# Patient Record
Sex: FEMALE | Race: WHITE | NOT HISPANIC OR LATINO | Employment: UNEMPLOYED | URBAN - METROPOLITAN AREA
[De-identification: names, ages, dates, MRNs, and addresses within clinical notes are randomized per-mention and may not be internally consistent; named-entity substitution may affect disease eponyms.]

---

## 2020-01-13 LAB — CRC RECOMMENDATION EXT: NORMAL

## 2022-12-07 LAB — BCS RECOMMENDATION EXT: NORMAL

## 2023-04-13 LAB
A1C: 4.9
HDLC SERPL-MCNC: 90 MG/DL
LDLC SERPL CALC-MCNC: 62 MG/DL
LIPIDS, TOTAL CHOLESTEROL: 162
TRIGL SERPL-MCNC: 76 MG/DL

## 2023-05-09 LAB — HCV AB SER-ACNC: NEGATIVE

## 2023-05-15 LAB
HUMAN PAPILLOMAVIRUS (HPV): NORMAL
PAP RECOMMENDATION EXT: NORMAL
PAP SMEAR: NORMAL

## 2023-08-22 ENCOUNTER — TELEPHONE (OUTPATIENT)
Dept: FAMILY MEDICINE | Facility: CLINIC | Age: 53
End: 2023-08-22

## 2023-08-22 ENCOUNTER — HOSPITAL ENCOUNTER (OUTPATIENT)
Dept: RADIOLOGY | Facility: HOSPITAL | Age: 53
Discharge: HOME | End: 2023-08-22
Payer: OTHER GOVERNMENT

## 2023-08-22 ENCOUNTER — OFFICE VISIT (OUTPATIENT)
Dept: FAMILY MEDICINE | Facility: CLINIC | Age: 53
End: 2023-08-22
Payer: OTHER GOVERNMENT

## 2023-08-22 VITALS
BODY MASS INDEX: 27.32 KG/M2 | WEIGHT: 170 LBS | HEART RATE: 76 BPM | SYSTOLIC BLOOD PRESSURE: 115 MMHG | OXYGEN SATURATION: 98 % | RESPIRATION RATE: 16 BRPM | HEIGHT: 66 IN | DIASTOLIC BLOOD PRESSURE: 75 MMHG

## 2023-08-22 DIAGNOSIS — M25.522 LEFT ELBOW PAIN: ICD-10-CM

## 2023-08-22 DIAGNOSIS — M25.50 MULTIPLE JOINT PAIN: ICD-10-CM

## 2023-08-22 DIAGNOSIS — F10.10 ALCOHOL ABUSE: ICD-10-CM

## 2023-08-22 DIAGNOSIS — F33.1 MODERATE EPISODE OF RECURRENT MAJOR DEPRESSIVE DISORDER: ICD-10-CM

## 2023-08-22 DIAGNOSIS — L98.9 SKIN LESION: ICD-10-CM

## 2023-08-22 DIAGNOSIS — M54.2 NECK PAIN: ICD-10-CM

## 2023-08-22 DIAGNOSIS — Z76.89 ENCOUNTER TO ESTABLISH CARE: Primary | ICD-10-CM

## 2023-08-22 DIAGNOSIS — I49.3 VENTRICULAR PREMATURE BEATS: ICD-10-CM

## 2023-08-22 PROBLEM — S82.63XA FRACTURE OF LATERAL MALLEOLUS: Status: ACTIVE | Noted: 2023-08-22

## 2023-08-22 PROBLEM — K58.9 IRRITABLE BOWEL SYNDROME: Status: ACTIVE | Noted: 2023-08-22

## 2023-08-22 PROBLEM — J30.9 ALLERGIC RHINITIS: Status: ACTIVE | Noted: 2023-08-22

## 2023-08-22 PROBLEM — H11.009 PTERYGIUM OF EYE: Status: ACTIVE | Noted: 2023-08-22

## 2023-08-22 PROBLEM — F32.9 MAJOR DEPRESSIVE DISORDER: Status: ACTIVE | Noted: 2023-05-15

## 2023-08-22 PROBLEM — F41.9 ANXIETY: Status: ACTIVE | Noted: 2023-08-22

## 2023-08-22 PROCEDURE — 99204 PR OFFICE/OUTPT VISIT, NEW, LEVL IV, 45-59 MIN: ICD-10-PCS | Mod: 25,S$GLB,,

## 2023-08-22 PROCEDURE — 99204 OFFICE O/P NEW MOD 45 MIN: CPT | Mod: 25,S$GLB,,

## 2023-08-22 PROCEDURE — 93000 POCT EKG 12-LEAD: ICD-10-PCS | Mod: S$GLB,,,

## 2023-08-22 PROCEDURE — 73080 X-RAY EXAM OF ELBOW: CPT | Mod: TC,PO,LT

## 2023-08-22 PROCEDURE — 93000 ELECTROCARDIOGRAM COMPLETE: CPT | Mod: S$GLB,,,

## 2023-08-22 PROCEDURE — 72050 X-RAY EXAM NECK SPINE 4/5VWS: CPT | Mod: TC,PO

## 2023-08-22 RX ORDER — DICLOFENAC SODIUM 10 MG/G
GEL TOPICAL
COMMUNITY
Start: 2023-05-15 | End: 2023-10-23

## 2023-08-22 RX ORDER — TRETINOIN 0.5 MG/G
CREAM TOPICAL
COMMUNITY
Start: 2023-05-15

## 2023-08-22 RX ORDER — METOPROLOL SUCCINATE 25 MG/1
TABLET, EXTENDED RELEASE ORAL
COMMUNITY
Start: 2023-05-15 | End: 2023-10-06 | Stop reason: SDUPTHER

## 2023-08-22 RX ORDER — CALCIPOTRIENE 50 UG/G
OINTMENT TOPICAL
COMMUNITY
Start: 2023-08-21

## 2023-08-22 RX ORDER — SERTRALINE HYDROCHLORIDE 200 MG/1
CAPSULE ORAL
COMMUNITY
Start: 2023-08-21 | End: 2024-02-20

## 2023-08-22 RX ORDER — MULTIVITAMIN
TABLET ORAL
COMMUNITY
Start: 2022-12-14

## 2023-08-22 RX ORDER — HYDROXYZINE HYDROCHLORIDE 25 MG/1
TABLET, FILM COATED ORAL
COMMUNITY
Start: 2022-08-15 | End: 2023-09-18 | Stop reason: SDUPTHER

## 2023-08-22 RX ORDER — FOLIC ACID 1 MG/1
1 TABLET ORAL
COMMUNITY
Start: 2022-08-21 | End: 2023-10-06 | Stop reason: SDUPTHER

## 2023-08-22 RX ORDER — MELOXICAM 7.5 MG/1
7.5 TABLET ORAL 2 TIMES DAILY PRN
Qty: 30 TABLET | Refills: 1 | Status: SHIPPED | OUTPATIENT
Start: 2023-08-22 | End: 2023-09-13 | Stop reason: SDUPTHER

## 2023-08-22 RX ORDER — OMEPRAZOLE 20 MG/1
20 CAPSULE, DELAYED RELEASE ORAL
COMMUNITY
Start: 2023-05-15 | End: 2023-10-06 | Stop reason: SDUPTHER

## 2023-08-22 RX ORDER — ATORVASTATIN CALCIUM 10 MG/1
10 TABLET, FILM COATED ORAL
COMMUNITY
Start: 2023-05-15 | End: 2023-09-06

## 2023-08-22 NOTE — TELEPHONE ENCOUNTER
----- Message from JETHRO Dominguez-TYRON sent at 8/22/2023  4:37 PM CDT -----  Please let Mrs. Newsome know that her elbow xray was normal and her neck xray just shows arthritis. We can refer to ortho for elbow AND neck, or we can do ortho for elbow and pain management for neck. Up to her. Ortho- anyone at [address] [place] [address] Drive. [facility] for pm if they take [facility]. Thanks.

## 2023-08-22 NOTE — PROGRESS NOTES
SUBJECTIVE:    Patient ID: Joanne Hummel is a 52 y.o. female.    Chief Complaint: Establish Care and Medication Refill    Patient presents to clinic today to establish care.  She has several concerns to discuss today.  First she states that she has broken her  left ankle twice in the last 2 years, and she continues to have intermittent swelling. Also has some mild ache in the joint.  She complains of joint stiffness and pain upon waking that lasts throughout the day.  This is in multiple joints including her ankles knees shoulders hips hands.  She stopped taking her Lipitor due to this discomfort.  She does note that being off the Lipitor has not made any positive change.  She has also been having pain in her left elbow, she states for several weeks, which feels like a dull ache towards the outside of her joint.  She states that it does not radiate.  It is aggravated by movement.  She also complains of neck stiffness and pain and states that she has had x-rays in the past that did show some arthritis in her neck.  She feels like she goes from hot to cold within minutes. Went through menopause with no period since age 46 approx.  She has had skin mapping by derm, and has had lesions removed but all were benign.  She would like a referral to a dermatologist locally.  She and her family moved here recently.  Her  is in the [employer].  There was stationed here.  Discussed the state of her mental health.  She states that she is unhappy in her relationship at this time.  She states that she has spoken with her partner regarding her feelings but there is a lack of communication.  She would like a referral to psych for counseling.  She denies being a risk for suicide.  She states she is happy with life she is just not happy in her relationship.  She does suffer from anxiety.  She treats her stressors with consumption of alcohol.  She states that she drinks approximately 3 drinks of hard liquor daily.  She does  not need to drink when she wakes.  She is a history of IBS with diarrhea.  She has been noticing small red spots on her skin that appear to be similar to a bug bite but do not have ahead and do not itch.  She quit smoking 7 years ago.  She smoked from the time she was 13 years old.  She did have some labs with her most recent PCP prior to moving here.  She does have a copy of those today.  They will be loaded into media.  She also had a DEXA scan in May.  It did show osteopenia.  Patient has a history of palpitations.  We will do an EKG today for baseline.  She would like to discuss the possibility of having a breast reduction as she feels like a weight of her breasts cause of her shoulder and neck pain.  Given Mrs. Newsome's  complaints today like to have her follow-up for a well visit in the near future so that we can discuss health maintenance.  At this time I would like to investigate her current complaints.  She does state that she would like to come back in a week to discuss her lab and test results.    Medication Refill  Associated symptoms include arthralgias, fatigue, joint swelling, myalgias and neck pain. Pertinent negatives include no abdominal pain, chest pain, chills, congestion, coughing, fever, headaches, nausea, numbness, rash, sore throat, vomiting or weakness.       Office Visit on 08/22/2023   Component Date Value Ref Range Status    EKG 12-Lead 08/27/2023    Final    Ventricular Rate 08/27/2023 62   Final    NE Interval 08/27/2023 152   Final    QRS Duration 08/27/2023 78   Final    QT/QTc 08/27/2023 418/424   Final    PRT Axes 08/27/2023 37  81  67   Final    WBC 08/22/2023 6.6  3.8 - 10.8 Thousand/uL Final    RBC 08/22/2023 4.30  3.80 - 5.10 Million/uL Final    Hemoglobin 08/22/2023 12.1  11.7 - 15.5 g/dL Final    Hematocrit 08/22/2023 37.1  35.0 - 45.0 % Final    MCV 08/22/2023 86.3  80.0 - 100.0 fL Final    MCH 08/22/2023 28.1  27.0 - 33.0 pg Final    MCHC 08/22/2023 32.6  32.0 - 36.0 g/dL  Final    RDW 08/22/2023 13.3  11.0 - 15.0 % Final    Platelets 08/22/2023 234  140 - 400 Thousand/uL Final    MPV 08/22/2023 9.7  7.5 - 12.5 fL Final    Neutrophils, Abs 08/22/2023 4,105  1,500 - 7,800 cells/uL Final    Lymph # 08/22/2023 1,888  850 - 3,900 cells/uL Final    Mono # 08/22/2023 442  200 - 950 cells/uL Final    Eos # 08/22/2023 132  15 - 500 cells/uL Final    Baso # 08/22/2023 33  0 - 200 cells/uL Final    Neutrophils Relative 08/22/2023 62.2  % Final    Lymph % 08/22/2023 28.6  % Final    Mono % 08/22/2023 6.7  % Final    Eosinophil % 08/22/2023 2.0  % Final    Basophil % 08/22/2023 0.5  % Final    SHARAN 08/22/2023 POSITIVE (A)  NEGATIVE Final    SHARAN Titer 08/22/2023 1:320 (H)  titer Final    SHARAN Pattern 08/22/2023 Nuclear, Dense Fine Speckled (A)   Final    Glucose 08/22/2023 87  65 - 99 mg/dL Final    BUN 08/22/2023 18  7 - 25 mg/dL Final    Creatinine 08/22/2023 0.70  0.50 - 1.03 mg/dL Final    eGFR 08/22/2023 103  > OR = 60 mL/min/1.73m2 Final    BUN/Creatinine Ratio 08/22/2023 SEE NOTE:  6 - 22 (calc) Final    Sodium 08/22/2023 139  135 - 146 mmol/L Final    Potassium 08/22/2023 4.6  3.5 - 5.3 mmol/L Final    Chloride 08/22/2023 102  98 - 110 mmol/L Final    CO2 08/22/2023 29  20 - 32 mmol/L Final    Calcium 08/22/2023 9.8  8.6 - 10.4 mg/dL Final    Total Protein 08/22/2023 7.7  6.1 - 8.1 g/dL Final    Albumin 08/22/2023 4.8  3.6 - 5.1 g/dL Final    Globulin, Total 08/22/2023 2.9  1.9 - 3.7 g/dL (calc) Final    Albumin/Globulin Ratio 08/22/2023 1.7  1.0 - 2.5 (calc) Final    Total Bilirubin 08/22/2023 0.3  0.2 - 1.2 mg/dL Final    Alkaline Phosphatase 08/22/2023 75  37 - 153 U/L Final    AST 08/22/2023 20  10 - 35 U/L Final    ALT 08/22/2023 22  6 - 29 U/L Final    T4, Free 08/22/2023 1.0  0.8 - 1.8 ng/dL Final    TSH 08/22/2023 2.82  mIU/L Final    Color, UA 08/22/2023 YELLOW  YELLOW Final    Appearance, UA 08/22/2023 CLEAR  CLEAR Final    Specific Gravity, UA 08/22/2023 1.018  1.001 - 1.035 Final     pH, UA 08/22/2023 7.0  5.0 - 8.0 Final    Glucose, UA 08/22/2023 NEGATIVE  NEGATIVE Final    Bilirubin, UA 08/22/2023 NEGATIVE  NEGATIVE Final    Ketones, UA 08/22/2023 NEGATIVE  NEGATIVE Final    Occult Blood UA 08/22/2023 NEGATIVE  NEGATIVE Final    Protein, UA 08/22/2023 NEGATIVE  NEGATIVE Final    Nitrite, UA 08/22/2023 NEGATIVE  NEGATIVE Final    Leukocytes, UA 08/22/2023 NEGATIVE  NEGATIVE Final    WBC Casts, UA 08/22/2023 NONE SEEN  < OR = 5 /HPF Final    RBC Casts, UA 08/22/2023 NONE SEEN  < OR = 2 /HPF Final    Squam Epithel, UA 08/22/2023 NONE SEEN  < OR = 5 /HPF Final    Bacteria, UA 08/22/2023 NONE SEEN  NONE SEEN /HPF Final    Hyaline Casts, UA 08/22/2023 NONE SEEN  NONE SEEN /LPF Final    Service Cmt: 08/22/2023    Final    Reflexive Urine Culture 08/22/2023    Final       Past Medical History:   Diagnosis Date    Allergy     Anemia     Anxiety     Arthritis     Asthma     Depression      Social History     Socioeconomic History    Marital status:    Tobacco Use    Smoking status: Former     Current packs/day: 1.50     Average packs/day: 1.5 packs/day for 7.7 years (11.5 ttl pk-yrs)     Types: Cigarettes     Start date: 2016     Quit date: 1983    Smokeless tobacco: Never   Substance and Sexual Activity    Alcohol use: Yes     Comment: social    Drug use: Never    Sexual activity: Yes     Partners: Male     Past Surgical History:   Procedure Laterality Date    AUGMENTATION OF BREAST Bilateral     colonscopy      comestic       EYE SURGERY      TONSILLECTOMY       Family History   Problem Relation Age of Onset    Arthritis Mother     Skin cancer Mother     Alcohol abuse Father     COPD Maternal Grandfather     Alcohol abuse Paternal Grandfather        Review of patient's allergies indicates:   Allergen Reactions    Epinephrine Palpitations       Current Outpatient Medications:     atorvastatin (LIPITOR) 10 MG tablet, Take 10 mg by mouth., Disp: , Rfl:     calcipotriene (DOVONOX) 0.005 %  ointment, , Disp: , Rfl:     calcium-vitamin D 600 mg-10 mcg (400 unit) Tab, Take by mouth., Disp: , Rfl:     DHA-EPA-LIPOTROPIC AGENT-VIT E ORAL, , Disp: , Rfl:     diclofenac sodium (VOLTAREN) 1 % Gel,  See instructions, Apply 2 grams (upper extremities) or 4 grams (lower extremities) to affected area topically four times daily as needed for pain. Max total body dose of 32 grams per day, # 100 g, 3 total refill(s), Maintenance, please allow early juanita..., Disp: , Rfl:     folic acid (FOLVITE) 1 MG tablet, Take 1 mg by mouth., Disp: , Rfl:     hydrOXYzine HCL (ATARAX) 25 MG tablet, , Disp: , Rfl:     metoprolol succinate (TOPROL XL) 25 MG 24 hr tablet, , Disp: , Rfl:     omeprazole (PRILOSEC) 20 MG capsule, Take 20 mg by mouth., Disp: , Rfl:     sertraline 200 mg Cap, , Disp: , Rfl:     tretinoin (RETIN-A) 0.05 % cream, , Disp: , Rfl:     meloxicam (MOBIC) 7.5 MG tablet, Take 1 tablet (7.5 mg total) by mouth 2 (two) times daily as needed for Pain., Disp: 30 tablet, Rfl: 1    Review of Systems   Constitutional:  Positive for fatigue. Negative for activity change, appetite change, chills, fever and unexpected weight change.   HENT:  Negative for nasal congestion, ear pain, postnasal drip, rhinorrhea, sore throat and trouble swallowing.    Eyes:  Negative for photophobia, pain, discharge, redness, itching, visual disturbance and eye dryness.   Respiratory:  Negative for apnea, cough, shortness of breath and wheezing.    Cardiovascular:  Positive for palpitations. Negative for chest pain and leg swelling.   Gastrointestinal:  Positive for reflux. Negative for abdominal pain, blood in stool, constipation, diarrhea, nausea and vomiting.   Endocrine: Positive for cold intolerance and heat intolerance.   Genitourinary:  Positive for hot flashes. Negative for bladder incontinence, dysuria, frequency and urgency.   Musculoskeletal:  Positive for arthralgias, joint swelling, myalgias, neck pain and neck stiffness. Negative for  "gait problem and leg pain.   Integumentary:  Positive for mole/lesion. Negative for rash.   Neurological:  Negative for dizziness, tremors, weakness, light-headedness, numbness and headaches.   Hematological:  Does not bruise/bleed easily.   Psychiatric/Behavioral:  Positive for dysphoric mood and sleep disturbance. Negative for suicidal ideas. The patient is nervous/anxious.            Objective:      Vitals:    08/22/23 1057   BP: 115/75   Pulse: 76   Resp: 16   SpO2: 98%   Weight: 77.1 kg (170 lb)   Height: 5' 6" (1.676 m)     Physical Exam  Vitals and nursing note reviewed.   Constitutional:       General: She is not in acute distress.     Appearance: Normal appearance. She is well-developed and normal weight. She is not ill-appearing.   HENT:      Head: Normocephalic and atraumatic.      Right Ear: Tympanic membrane and external ear normal.      Left Ear: Tympanic membrane and external ear normal.      Nose: Nose normal. No congestion or rhinorrhea.      Mouth/Throat:      Mouth: Mucous membranes are moist.      Pharynx: Oropharynx is clear. No oropharyngeal exudate or posterior oropharyngeal erythema.   Eyes:      General:         Right eye: No discharge.         Left eye: No discharge.      Conjunctiva/sclera: Conjunctivae normal.      Pupils: Pupils are equal, round, and reactive to light.   Neck:      Thyroid: No thyromegaly.      Vascular: No carotid bruit.      Comments: Reduced mildly  Cardiovascular:      Rate and Rhythm: Normal rate and regular rhythm.      Pulses: Normal pulses.      Heart sounds: Normal heart sounds. No murmur heard.  Pulmonary:      Effort: Pulmonary effort is normal.      Breath sounds: Normal breath sounds. No wheezing or rales.   Abdominal:      General: Bowel sounds are normal. There is no distension.      Palpations: Abdomen is soft.      Tenderness: There is no abdominal tenderness.   Musculoskeletal:         General: No tenderness or deformity. Normal range of motion.      " Cervical back: Neck supple.      Lumbar back: Normal. No spasms.      Right lower leg: No edema.      Left lower leg: No edema.      Comments: Bends 90 degrees at  waist   Lymphadenopathy:      Cervical: No cervical adenopathy.   Skin:     General: Skin is warm and dry.      Capillary Refill: Capillary refill takes less than 2 seconds.      Coloration: Skin is not jaundiced or pale.      Findings: Lesion present. No bruising or rash.   Neurological:      General: No focal deficit present.      Mental Status: She is alert and oriented to person, place, and time. Mental status is at baseline.      Cranial Nerves: No cranial nerve deficit.      Motor: No weakness.      Coordination: Coordination normal.      Gait: Gait normal.   Psychiatric:         Mood and Affect: Mood normal.         Behavior: Behavior normal.         Thought Content: Thought content normal.         Judgment: Judgment normal.           Assessment:       1. Encounter to establish care    2. Left elbow pain    3. Moderate episode of recurrent major depressive disorder    4. Skin lesion    5. Neck pain    6. Ventricular premature beats    7. Alcohol abuse    8. Multiple joint pain         Plan:       Encounter to establish care  Labs for diagnosis and treatment  -     CBC Auto Differential; Future; Expected date: 08/22/2023  -     Comprehensive Metabolic Panel; Future; Expected date: 08/22/2023  -     T4, Free; Future; Expected date: 08/22/2023  -     TSH; Future; Expected date: 08/22/2023  -     Urinalysis, Reflex to Urine Culture Urine, Clean Catch; Future; Expected date: 08/22/2023    Left elbow pain  Will get xray to eval.  -     X-Ray Elbow Complete Left; Future; Expected date: 08/22/2023  -     meloxicam (MOBIC) 7.5 MG tablet; Take 1 tablet (7.5 mg total) by mouth 2 (two) times daily as needed for Pain.  Dispense: 30 tablet; Refill: 1    Moderate episode of recurrent major depressive disorder.  Referral. Continue current meds as patient states  condition is well controlled  -     Ambulatory referral/consult to Psychiatry; Future; Expected date: 08/29/2023    Skin lesion  Small pink lesions sparsely scattered randomly. Not petechiae. No heads. Non pruritic.  -     Ambulatory referral/consult to Dermatology; Future; Expected date: 08/29/2023    Neck pain  Xray to eval  -     X-Ray Cervical Spine Complete 5 view; Future; Expected date: 08/22/2023    Ventricular premature beats  Baseline ekg  -     POCT EKG 12-LEAD (Manually Resulted by Ordering Provider)    Alcohol abuse  Discussed serious effects of this. Discussed stopping. Advised patient that if she does want to stop, let me know, as we should start medication to prevent seizures and other issues with withdrawal  -     Comprehensive Metabolic Panel; Future; Expected date: 08/22/2023  -     T4, Free; Future; Expected date: 08/22/2023  -     TSH; Future; Expected date: 08/22/2023    Multiple joint pain  For diagnosis or ruling out conditions  -     SHARAN Screen w/Reflex; Future; Expected date: 08/22/2023      Follow up in about 1 week (around 8/29/2023).        8/27/2023 Alyx English

## 2023-08-22 NOTE — PROGRESS NOTES
Please let Mrs. Newsome know that her elbow xray was normal and her neck xray just shows arthritis. We can refer to ortho for elbow AND neck, or we can do ortho for elbow and pain management for neck. Up to her. Ortho- anyone at [address] [place] [address] Drive. [facility] for pm if they take [facility]. Thanks.

## 2023-08-23 ENCOUNTER — PATIENT MESSAGE (OUTPATIENT)
Dept: FAMILY MEDICINE | Facility: CLINIC | Age: 53
End: 2023-08-23

## 2023-08-23 DIAGNOSIS — M54.2 NECK PAIN: ICD-10-CM

## 2023-08-23 DIAGNOSIS — M25.522 LEFT ELBOW PAIN: Primary | ICD-10-CM

## 2023-08-24 ENCOUNTER — PATIENT MESSAGE (OUTPATIENT)
Dept: FAMILY MEDICINE | Facility: CLINIC | Age: 53
End: 2023-08-24

## 2023-08-24 ENCOUNTER — TELEPHONE (OUTPATIENT)
Dept: FAMILY MEDICINE | Facility: CLINIC | Age: 53
End: 2023-08-24

## 2023-08-24 DIAGNOSIS — M25.522 LEFT ELBOW PAIN: ICD-10-CM

## 2023-08-24 DIAGNOSIS — M25.50 MULTIPLE JOINT PAIN: ICD-10-CM

## 2023-08-24 DIAGNOSIS — R76.8 POSITIVE ANA (ANTINUCLEAR ANTIBODY): Primary | ICD-10-CM

## 2023-08-24 DIAGNOSIS — M54.2 NECK PAIN: Primary | ICD-10-CM

## 2023-08-24 LAB
ALBUMIN SERPL-MCNC: 4.8 G/DL (ref 3.6–5.1)
ALBUMIN/GLOB SERPL: 1.7 (CALC) (ref 1–2.5)
ALP SERPL-CCNC: 75 U/L (ref 37–153)
ALT SERPL-CCNC: 22 U/L (ref 6–29)
ANA PAT SER IF-IMP: ABNORMAL
ANA SER QL IF: POSITIVE
ANA TITR SER IF: ABNORMAL TITER
APPEARANCE UR: CLEAR
AST SERPL-CCNC: 20 U/L (ref 10–35)
BACTERIA #/AREA URNS HPF: NORMAL /HPF
BACTERIA UR CULT: NORMAL
BASOPHILS # BLD AUTO: 33 CELLS/UL (ref 0–200)
BASOPHILS NFR BLD AUTO: 0.5 %
BILIRUB SERPL-MCNC: 0.3 MG/DL (ref 0.2–1.2)
BILIRUB UR QL STRIP: NEGATIVE
BUN SERPL-MCNC: 18 MG/DL (ref 7–25)
BUN/CREAT SERPL: NORMAL (CALC) (ref 6–22)
CALCIUM SERPL-MCNC: 9.8 MG/DL (ref 8.6–10.4)
CHLORIDE SERPL-SCNC: 102 MMOL/L (ref 98–110)
CO2 SERPL-SCNC: 29 MMOL/L (ref 20–32)
COLOR UR: YELLOW
CREAT SERPL-MCNC: 0.7 MG/DL (ref 0.5–1.03)
EGFR: 103 ML/MIN/1.73M2
EOSINOPHIL # BLD AUTO: 132 CELLS/UL (ref 15–500)
EOSINOPHIL NFR BLD AUTO: 2 %
ERYTHROCYTE [DISTWIDTH] IN BLOOD BY AUTOMATED COUNT: 13.3 % (ref 11–15)
GLOBULIN SER CALC-MCNC: 2.9 G/DL (CALC) (ref 1.9–3.7)
GLUCOSE SERPL-MCNC: 87 MG/DL (ref 65–99)
GLUCOSE UR QL STRIP: NEGATIVE
HCT VFR BLD AUTO: 37.1 % (ref 35–45)
HGB BLD-MCNC: 12.1 G/DL (ref 11.7–15.5)
HGB UR QL STRIP: NEGATIVE
HYALINE CASTS #/AREA URNS LPF: NORMAL /LPF
KETONES UR QL STRIP: NEGATIVE
LEUKOCYTE ESTERASE UR QL STRIP: NEGATIVE
LYMPHOCYTES # BLD AUTO: 1888 CELLS/UL (ref 850–3900)
LYMPHOCYTES NFR BLD AUTO: 28.6 %
MCH RBC QN AUTO: 28.1 PG (ref 27–33)
MCHC RBC AUTO-ENTMCNC: 32.6 G/DL (ref 32–36)
MCV RBC AUTO: 86.3 FL (ref 80–100)
MONOCYTES # BLD AUTO: 442 CELLS/UL (ref 200–950)
MONOCYTES NFR BLD AUTO: 6.7 %
NEUTROPHILS # BLD AUTO: 4105 CELLS/UL (ref 1500–7800)
NEUTROPHILS NFR BLD AUTO: 62.2 %
NITRITE UR QL STRIP: NEGATIVE
PH UR STRIP: 7 [PH] (ref 5–8)
PLATELET # BLD AUTO: 234 THOUSAND/UL (ref 140–400)
PMV BLD REES-ECKER: 9.7 FL (ref 7.5–12.5)
POTASSIUM SERPL-SCNC: 4.6 MMOL/L (ref 3.5–5.3)
PROT SERPL-MCNC: 7.7 G/DL (ref 6.1–8.1)
PROT UR QL STRIP: NEGATIVE
RBC # BLD AUTO: 4.3 MILLION/UL (ref 3.8–5.1)
RBC #/AREA URNS HPF: NORMAL /HPF
SERVICE CMNT-IMP: NORMAL
SODIUM SERPL-SCNC: 139 MMOL/L (ref 135–146)
SP GR UR STRIP: 1.02 (ref 1–1.03)
SQUAMOUS #/AREA URNS HPF: NORMAL /HPF
T4 FREE SERPL-MCNC: 1 NG/DL (ref 0.8–1.8)
TSH SERPL-ACNC: 2.82 MIU/L
WBC # BLD AUTO: 6.6 THOUSAND/UL (ref 3.8–10.8)
WBC #/AREA URNS HPF: NORMAL /HPF

## 2023-08-24 NOTE — TELEPHONE ENCOUNTER
Please let her know her SHARAN was positive and this could potentially mean an autoimmune issue that could possibly be a reason for her multiple joint complaints. I have put in referral for Dr. Richards in [place]

## 2023-08-24 NOTE — TELEPHONE ENCOUNTER
Thank you for flagging. I've been trying to research in between patients today. I am ultimately going to refer her.

## 2023-08-24 NOTE — TELEPHONE ENCOUNTER
I did ask Dr. Parson for you, just because I know we are slim to none, but he did say to look into   Dr. Patel Reyes

## 2023-08-25 ENCOUNTER — PATIENT MESSAGE (OUTPATIENT)
Dept: FAMILY MEDICINE | Facility: CLINIC | Age: 53
End: 2023-08-25

## 2023-08-27 PROBLEM — M54.2 NECK PAIN: Status: ACTIVE | Noted: 2023-08-27

## 2023-08-27 PROBLEM — M25.522 LEFT ELBOW PAIN: Status: ACTIVE | Noted: 2023-08-27

## 2023-08-27 PROBLEM — M25.50 MULTIPLE JOINT PAIN: Status: ACTIVE | Noted: 2023-08-27

## 2023-08-27 LAB
EKG 12-LEAD: NORMAL
PR INTERVAL: 152
PRT AXES: NORMAL
QRS DURATION: 78
QT/QTC: NORMAL
VENTRICULAR RATE: 62

## 2023-08-29 ENCOUNTER — PATIENT MESSAGE (OUTPATIENT)
Dept: FAMILY MEDICINE | Facility: CLINIC | Age: 53
End: 2023-08-29

## 2023-08-30 DIAGNOSIS — Z12.31 OTHER SCREENING MAMMOGRAM: ICD-10-CM

## 2023-08-31 ENCOUNTER — PATIENT MESSAGE (OUTPATIENT)
Dept: FAMILY MEDICINE | Facility: CLINIC | Age: 53
End: 2023-08-31

## 2023-09-01 ENCOUNTER — OFFICE VISIT (OUTPATIENT)
Dept: FAMILY MEDICINE | Facility: CLINIC | Age: 53
End: 2023-09-01
Payer: OTHER GOVERNMENT

## 2023-09-01 ENCOUNTER — TELEPHONE (OUTPATIENT)
Dept: RHEUMATOLOGY | Facility: CLINIC | Age: 53
End: 2023-09-01
Payer: OTHER GOVERNMENT

## 2023-09-01 ENCOUNTER — TELEPHONE (OUTPATIENT)
Dept: FAMILY MEDICINE | Facility: CLINIC | Age: 53
End: 2023-09-01
Payer: OTHER GOVERNMENT

## 2023-09-01 ENCOUNTER — PATIENT MESSAGE (OUTPATIENT)
Dept: FAMILY MEDICINE | Facility: CLINIC | Age: 53
End: 2023-09-01

## 2023-09-01 ENCOUNTER — PATIENT MESSAGE (OUTPATIENT)
Dept: RHEUMATOLOGY | Facility: CLINIC | Age: 53
End: 2023-09-01
Payer: OTHER GOVERNMENT

## 2023-09-01 ENCOUNTER — PATIENT MESSAGE (OUTPATIENT)
Dept: FAMILY MEDICINE | Facility: CLINIC | Age: 53
End: 2023-09-01
Payer: OTHER GOVERNMENT

## 2023-09-01 VITALS
DIASTOLIC BLOOD PRESSURE: 60 MMHG | BODY MASS INDEX: 27.35 KG/M2 | OXYGEN SATURATION: 95 % | WEIGHT: 170.19 LBS | SYSTOLIC BLOOD PRESSURE: 90 MMHG | HEART RATE: 82 BPM | HEIGHT: 66 IN

## 2023-09-01 DIAGNOSIS — M25.50 MULTIPLE JOINT PAIN: ICD-10-CM

## 2023-09-01 DIAGNOSIS — F43.9 SITUATIONAL STRESS: Primary | ICD-10-CM

## 2023-09-01 DIAGNOSIS — F41.9 ANXIETY: ICD-10-CM

## 2023-09-01 DIAGNOSIS — R76.8 POSITIVE ANA (ANTINUCLEAR ANTIBODY): Primary | ICD-10-CM

## 2023-09-01 DIAGNOSIS — F32.9 MAJOR DEPRESSIVE DISORDER WITH CURRENT ACTIVE EPISODE, UNSPECIFIED DEPRESSION EPISODE SEVERITY, UNSPECIFIED WHETHER RECURRENT: ICD-10-CM

## 2023-09-01 PROCEDURE — 99213 PR OFFICE/OUTPT VISIT, EST, LEVL III, 20-29 MIN: ICD-10-PCS | Mod: S$GLB,,,

## 2023-09-01 PROCEDURE — 99213 OFFICE O/P EST LOW 20 MIN: CPT | Mod: S$GLB,,,

## 2023-09-01 NOTE — TELEPHONE ENCOUNTER
----- Message from Nic Malcolm Patient Care Assistant sent at 9/1/2023  3:30 PM CDT -----  Contact: Pt  Type:  Sooner Appointment Request    Caller is requesting a sooner appointment.  Caller declined first available appointment listed below.  Caller will not accept being placed on the waitlist and is requesting a message be sent to doctor.    Name of Caller:  Pt  When is the first available appointment?  No avail appts  Symptoms:   Positive SHARAN (antinuclear antibody)/Referral on chart  Best Call Back Number:  [phone]  Additional Information:  Please advise

## 2023-09-01 NOTE — TELEPHONE ENCOUNTER
Derm referral: [facility] notifies they are out of network for patient. Voicemail full; portal message with scheduling information.

## 2023-09-03 NOTE — PROGRESS NOTES
SUBJECTIVE:    Patient ID: Joanne Hummel is a 52 y.o. female.    Chief Complaint: Follow-up (1 week follow up lab work in [facility]/ referral for marriage counseling//mp)    Patient is here for a follow up to previous visit to see how she is doing on new medications and to talk about results. She states she feels a lot better. The meloxicam is helping a great deal. The hydroxyzine is helping her sleep. She has not heard from all of her referrals. Will provide with information so she can follow up.     Follow-up  Associated symptoms include fatigue. Pertinent negatives include no abdominal pain, arthralgias, chest pain, chills, congestion, coughing, fever, headaches, joint swelling, myalgias, nausea, neck pain, numbness, rash, sore throat, vomiting or weakness.       Office Visit on 08/22/2023   Component Date Value Ref Range Status    EKG 12-Lead 08/27/2023    Final    Ventricular Rate 08/27/2023 62   Final    OR Interval 08/27/2023 152   Final    QRS Duration 08/27/2023 78   Final    QT/QTc 08/27/2023 418/424   Final    PRT Axes 08/27/2023 37  81  67   Final    WBC 08/22/2023 6.6  3.8 - 10.8 Thousand/uL Final    RBC 08/22/2023 4.30  3.80 - 5.10 Million/uL Final    Hemoglobin 08/22/2023 12.1  11.7 - 15.5 g/dL Final    Hematocrit 08/22/2023 37.1  35.0 - 45.0 % Final    MCV 08/22/2023 86.3  80.0 - 100.0 fL Final    MCH 08/22/2023 28.1  27.0 - 33.0 pg Final    MCHC 08/22/2023 32.6  32.0 - 36.0 g/dL Final    RDW 08/22/2023 13.3  11.0 - 15.0 % Final    Platelets 08/22/2023 234  140 - 400 Thousand/uL Final    MPV 08/22/2023 9.7  7.5 - 12.5 fL Final    Neutrophils, Abs 08/22/2023 4,105  1,500 - 7,800 cells/uL Final    Lymph # 08/22/2023 1,888  850 - 3,900 cells/uL Final    Mono # 08/22/2023 442  200 - 950 cells/uL Final    Eos # 08/22/2023 132  15 - 500 cells/uL Final    Baso # 08/22/2023 33  0 - 200 cells/uL Final    Neutrophils Relative 08/22/2023 62.2  % Final    Lymph % 08/22/2023 28.6  % Final    Mono % 08/22/2023 6.7  %  Final    Eosinophil % 08/22/2023 2.0  % Final    Basophil % 08/22/2023 0.5  % Final    SHARAN 08/22/2023 POSITIVE (A)  NEGATIVE Final    SHARAN Titer 08/22/2023 1:320 (H)  titer Final    SHARAN Pattern 08/22/2023 Nuclear, Dense Fine Speckled (A)   Final    Glucose 08/22/2023 87  65 - 99 mg/dL Final    BUN 08/22/2023 18  7 - 25 mg/dL Final    Creatinine 08/22/2023 0.70  0.50 - 1.03 mg/dL Final    eGFR 08/22/2023 103  > OR = 60 mL/min/1.73m2 Final    BUN/Creatinine Ratio 08/22/2023 SEE NOTE:  6 - 22 (calc) Final    Sodium 08/22/2023 139  135 - 146 mmol/L Final    Potassium 08/22/2023 4.6  3.5 - 5.3 mmol/L Final    Chloride 08/22/2023 102  98 - 110 mmol/L Final    CO2 08/22/2023 29  20 - 32 mmol/L Final    Calcium 08/22/2023 9.8  8.6 - 10.4 mg/dL Final    Total Protein 08/22/2023 7.7  6.1 - 8.1 g/dL Final    Albumin 08/22/2023 4.8  3.6 - 5.1 g/dL Final    Globulin, Total 08/22/2023 2.9  1.9 - 3.7 g/dL (calc) Final    Albumin/Globulin Ratio 08/22/2023 1.7  1.0 - 2.5 (calc) Final    Total Bilirubin 08/22/2023 0.3  0.2 - 1.2 mg/dL Final    Alkaline Phosphatase 08/22/2023 75  37 - 153 U/L Final    AST 08/22/2023 20  10 - 35 U/L Final    ALT 08/22/2023 22  6 - 29 U/L Final    T4, Free 08/22/2023 1.0  0.8 - 1.8 ng/dL Final    TSH 08/22/2023 2.82  mIU/L Final    Color, UA 08/22/2023 YELLOW  YELLOW Final    Appearance, UA 08/22/2023 CLEAR  CLEAR Final    Specific Gravity, UA 08/22/2023 1.018  1.001 - 1.035 Final    pH, UA 08/22/2023 7.0  5.0 - 8.0 Final    Glucose, UA 08/22/2023 NEGATIVE  NEGATIVE Final    Bilirubin, UA 08/22/2023 NEGATIVE  NEGATIVE Final    Ketones, UA 08/22/2023 NEGATIVE  NEGATIVE Final    Occult Blood UA 08/22/2023 NEGATIVE  NEGATIVE Final    Protein, UA 08/22/2023 NEGATIVE  NEGATIVE Final    Nitrite, UA 08/22/2023 NEGATIVE  NEGATIVE Final    Leukocytes, UA 08/22/2023 NEGATIVE  NEGATIVE Final    WBC Casts, UA 08/22/2023 NONE SEEN  < OR = 5 /HPF Final    RBC Casts, UA 08/22/2023 NONE SEEN  < OR = 2 /HPF Final     Squam Epithel, UA 08/22/2023 NONE SEEN  < OR = 5 /HPF Final    Bacteria, UA 08/22/2023 NONE SEEN  NONE SEEN /HPF Final    Hyaline Casts, UA 08/22/2023 NONE SEEN  NONE SEEN /LPF Final    Service Cmt: 08/22/2023    Final    Reflexive Urine Culture 08/22/2023    Final       Past Medical History:   Diagnosis Date    Allergy     Anemia     Anxiety     Arthritis     Asthma     Depression      Social History     Socioeconomic History    Marital status:    Tobacco Use    Smoking status: Former     Current packs/day: 1.50     Average packs/day: 1.5 packs/day for 7.7 years (11.5 ttl pk-yrs)     Types: Cigarettes     Start date: 2016     Quit date: 1983    Smokeless tobacco: Never   Substance and Sexual Activity    Alcohol use: Yes     Comment: social    Drug use: Never    Sexual activity: Yes     Partners: Male     Past Surgical History:   Procedure Laterality Date    AUGMENTATION OF BREAST Bilateral     colonscopy      comestic       EYE SURGERY      TONSILLECTOMY       Family History   Problem Relation Age of Onset    Arthritis Mother     Skin cancer Mother     Alcohol abuse Father     COPD Maternal Grandfather     Alcohol abuse Paternal Grandfather        Review of patient's allergies indicates:   Allergen Reactions    Epinephrine Palpitations       Current Outpatient Medications:     atorvastatin (LIPITOR) 10 MG tablet, Take 10 mg by mouth., Disp: , Rfl:     calcipotriene (DOVONOX) 0.005 % ointment, , Disp: , Rfl:     calcium-vitamin D 600 mg-10 mcg (400 unit) Tab, Take by mouth., Disp: , Rfl:     COLLAGEN-BIOTIN-ASCORBIC ACID ORAL, Take 1 capsule by mouth once daily., Disp: , Rfl:     DHA-EPA-LIPOTROPIC AGENT-VIT E ORAL, , Disp: , Rfl:     diclofenac sodium (VOLTAREN) 1 % Gel,  See instructions, Apply 2 grams (upper extremities) or 4 grams (lower extremities) to affected area topically four times daily as needed for pain. Max total body dose of 32 grams per day, # 100 g, 3 total refill(s), Maintenance, please  allow early juanita..., Disp: , Rfl:     folic acid (FOLVITE) 1 MG tablet, Take 1 mg by mouth., Disp: , Rfl:     hydrOXYzine HCL (ATARAX) 25 MG tablet, , Disp: , Rfl:     meloxicam (MOBIC) 7.5 MG tablet, Take 1 tablet (7.5 mg total) by mouth 2 (two) times daily as needed for Pain., Disp: 30 tablet, Rfl: 1    metoprolol succinate (TOPROL XL) 25 MG 24 hr tablet, , Disp: , Rfl:     omeprazole (PRILOSEC) 20 MG capsule, Take 20 mg by mouth., Disp: , Rfl:     sertraline 200 mg Cap, , Disp: , Rfl:     tretinoin (RETIN-A) 0.05 % cream, , Disp: , Rfl:     Review of Systems   Constitutional:  Positive for fatigue. Negative for activity change, appetite change, chills, fever and unexpected weight change.   HENT:  Negative for nasal congestion, ear pain, postnasal drip, rhinorrhea, sore throat and trouble swallowing.    Eyes:  Negative for photophobia, pain, discharge, redness, itching, visual disturbance and eye dryness.   Respiratory:  Negative for apnea, cough, shortness of breath and wheezing.    Cardiovascular:  Positive for palpitations. Negative for chest pain and leg swelling.   Gastrointestinal:  Positive for reflux. Negative for abdominal pain, blood in stool, constipation, diarrhea, nausea and vomiting.   Endocrine: Positive for cold intolerance and heat intolerance.   Genitourinary:  Positive for hot flashes. Negative for bladder incontinence, dysuria, frequency and urgency.   Musculoskeletal:  Negative for arthralgias, gait problem, joint swelling, leg pain, myalgias, neck pain and neck stiffness.   Integumentary:  Negative for rash and mole/lesion.   Neurological:  Negative for dizziness, tremors, weakness, light-headedness, numbness and headaches.   Hematological:  Does not bruise/bleed easily.   Psychiatric/Behavioral:  Positive for dysphoric mood. Negative for sleep disturbance and suicidal ideas. The patient is not nervous/anxious.            Objective:      Vitals:    09/01/23 1056   BP: 90/60   Pulse: 82   SpO2:  "95%   Weight: 77.2 kg (170 lb 3.2 oz)   Height: 5' 5.75" (1.67 m)     Physical Exam  Vitals and nursing note reviewed.   Constitutional:       General: She is not in acute distress.     Appearance: Normal appearance. She is well-developed and normal weight. She is not ill-appearing.   HENT:      Head: Normocephalic and atraumatic.      Nose: Nose normal.      Mouth/Throat:      Mouth: Mucous membranes are moist.      Pharynx: Oropharynx is clear.   Eyes:      General: No scleral icterus.  Neck:      Thyroid: No thyromegaly.      Comments: Reduced mildly  Cardiovascular:      Rate and Rhythm: Normal rate and regular rhythm.      Pulses: Normal pulses.      Heart sounds: Normal heart sounds. No murmur heard.  Pulmonary:      Effort: Pulmonary effort is normal.      Breath sounds: Normal breath sounds. No wheezing or rales.   Abdominal:      Palpations: Abdomen is soft.   Musculoskeletal:         General: Normal range of motion.      Lumbar back: Normal. No spasms.   Skin:     General: Skin is warm and dry.      Capillary Refill: Capillary refill takes less than 2 seconds.   Neurological:      General: No focal deficit present.      Mental Status: She is alert and oriented to person, place, and time. Mental status is at baseline.   Psychiatric:         Mood and Affect: Mood normal.           Assessment:       1. Situational stress    2. Anxiety    3. Major depressive disorder with current active episode, unspecified depression episode severity, unspecified whether recurrent         Plan:       Situational stress  Anxiety  Major depressive disorder with current active episode, unspecified depression episode severity, unspecified whether recurrent-       Ambulatory referral/consult to Psychology; Future; Expected date: 09/08/2023          Follow up in about 3 months (around 12/1/2023).        9/3/2023 Alyx English"

## 2023-09-07 ENCOUNTER — PATIENT MESSAGE (OUTPATIENT)
Dept: ADMINISTRATIVE | Facility: HOSPITAL | Age: 53
End: 2023-09-07
Payer: OTHER GOVERNMENT

## 2023-09-07 ENCOUNTER — PATIENT OUTREACH (OUTPATIENT)
Dept: ADMINISTRATIVE | Facility: HOSPITAL | Age: 53
End: 2023-09-07
Payer: OTHER GOVERNMENT

## 2023-09-07 NOTE — LETTER
AUTHORIZATION FOR RELEASE OF   CONFIDENTIAL INFORMATION    Dear Dr. Arun Uriostegui,    We are seeing Joanne Hummel, date of birth 1970, in the clinic at [address]. Jorge Parson MD is the patient's PCP. Joanne Hummel has an outstanding lab/procedure at the time we reviewed her chart. In order to help keep her health information updated, she has authorized us to request the following medical record(s):                                           ( X )  COLONOSCOPY ( Most Recent )       Please fax records to [facility], Casey, Michael D., MD, [phone]     If you have any questions, please contact     [facility]  Clinical Care Coordinator    [facility] / [facility]  [phone] (Phone)  [phone] (Fax)      Patient Name: Joanne Hummel  : 1970  Patient Phone #: [phone]

## 2023-09-11 ENCOUNTER — PATIENT OUTREACH (OUTPATIENT)
Dept: ADMINISTRATIVE | Facility: HOSPITAL | Age: 53
End: 2023-09-11
Payer: OTHER GOVERNMENT

## 2023-09-11 NOTE — PROGRESS NOTES
Population Health Chart Review & Patient Outreach Details:     Reason for Outreach Encounter:     [x]  Non-Compliant Report   []  Payor Report (Humana, PHN, BCBS, MSSP, MCIP, UHC, etc.)   []  Pre-Visit Chart Review     Updates Requested / Reviewed:     []  Care Everywhere    []     []  External Sources (LabCorp, Quest, DIS, etc.)   []  Care Team Updated    Patient Outreach Method:    []  Telephone Outreach Completed   [] Successful   [] Left Voicemail   [] Unable to Contact (wrong number, no voicemail)  []  [employer] Portal Outreach Sent  []  Letter Outreach Mailed  []  Fax Sent for External Records  [x]  External Records Upload    Health Maintenance Topics Addressed and Outreach Outcomes / Actions Taken:        []      Breast Cancer Screening []  Mammo Scheduled      []  External Records Requested     []  Added Reminder to Complete to Upcoming Primary Care Appt Notes     []  Patient Declined     []  Patient Will Call Back to Schedule     []  Patient Will Schedule with External Provider / Order Routed if Applicable             []       Cervical Cancer Screening []  Pap Scheduled      []  External Records Requested     []  Added Reminder to Complete to Upcoming Primary Care Appt Notes     []  Patient Declined     []  Patient Will Call Back to Schedule     []  Patient Will Schedule with External Provider               [x]          Colorectal Cancer Screening []  Colonoscopy Case Request or Referral Placed     []  External Records Requested     []  Added Reminder to Complete to Upcoming Primary Care Appt Notes     []  Patient Declined     []  Patient Will Call Back to Schedule     []  Patient Will Schedule with External Provider     []  Fit Kit Mailed (add the SmartPhrase under additional notes)     []  Reminded Patient to Complete Home Test             []      Diabetic Eye Exam []  Eye Camera Scheduled or Optometry Referral Placed     []  External Records Requested     []  Added Reminder to Complete to  Upcoming Primary Care Appt Notes     []  Patient Declined     []  Patient Will Call Back to Schedule     []  Patient Will Schedule with External Provider             []      Blood Pressure Control []  Primary Care Follow Up Visit Scheduled     []  Remote Blood Pressure Reading Captured     []  Added Reminder to Complete to Upcoming Primary Care Appt Notes     []  Patient Declined     []  Patient Will Call Back / Patient Will Send Portal Message with Reading     []  Patient Will Call Back to Schedule Provider Visit             []       HbA1c & Other Labs []  Lab Appt Scheduled for Due Labs     []  Primary Care Follow Up Visit Scheduled      []  Reminded Patient to Complete Home Test     []  Added Reminder to Complete to Upcoming Primary Care Appt Notes     []  Patient Declined     []  Patient Will Call Back to Schedule     []  Patient Will Schedule with External Provider / Order Routed if Applicable           []    Schedule Primary Care Appt []  Primary Care Appt Scheduled     []  Patient Declined     []  Patient Will Call Back to Schedule     []  Pt Established with External Provider & Updated Care Team             []      Medication Adherence []  Primary Care Appointment Scheduled     []  Added Reminder to Upcoming Primary Care Appt Notes     []  Patient Reminded to  Prescription     []  Patient Declined, Provider Notified if Needed     []  Sent Provider Message to Review and/or Add Exclusion to Problem List             []      Osteoporosis Screening []  DXA Appointment Scheduled     []  External Records Requested     []  Added Reminder to Complete to Upcoming Primary Care Appt Notes     []  Patient Declined     []  Patient Will Call Back to Schedule     []  Patient Will Schedule with External Provider / Order Routed if Applicable     Additional Care Coordinator Notes:         Further Action Needed If Patient Returns Outreach:

## 2023-09-13 ENCOUNTER — PATIENT MESSAGE (OUTPATIENT)
Dept: FAMILY MEDICINE | Facility: CLINIC | Age: 53
End: 2023-09-13

## 2023-09-13 DIAGNOSIS — M25.522 LEFT ELBOW PAIN: ICD-10-CM

## 2023-09-13 RX ORDER — MELOXICAM 7.5 MG/1
7.5 TABLET ORAL 2 TIMES DAILY PRN
Qty: 30 TABLET | Refills: 1 | Status: SHIPPED | OUTPATIENT
Start: 2023-09-13 | End: 2023-09-13 | Stop reason: SDUPTHER

## 2023-09-13 RX ORDER — MELOXICAM 7.5 MG/1
7.5 TABLET ORAL 2 TIMES DAILY PRN
Qty: 30 TABLET | Refills: 1 | Status: SHIPPED | OUTPATIENT
Start: 2023-09-13 | End: 2023-09-18 | Stop reason: SDUPTHER

## 2023-09-14 ENCOUNTER — PATIENT MESSAGE (OUTPATIENT)
Dept: FAMILY MEDICINE | Facility: CLINIC | Age: 53
End: 2023-09-14

## 2023-09-14 ENCOUNTER — PATIENT OUTREACH (OUTPATIENT)
Dept: ADMINISTRATIVE | Facility: HOSPITAL | Age: 53
End: 2023-09-14
Payer: OTHER GOVERNMENT

## 2023-09-14 DIAGNOSIS — F41.9 ANXIETY: Primary | ICD-10-CM

## 2023-09-14 RX ORDER — HYDROXYZINE HYDROCHLORIDE 25 MG/1
25 TABLET, FILM COATED ORAL 3 TIMES DAILY PRN
Qty: 90 TABLET | Refills: 1 | Status: CANCELLED | OUTPATIENT
Start: 2023-09-14 | End: 2024-09-13

## 2023-09-14 NOTE — PROGRESS NOTES
Population Health Chart Review & Patient Outreach Details:     Reason for Outreach Encounter:     [x]  Non-Compliant Report   []  Payor Report (Humana, PHN, BCBS, MSSP, MCIP, UHC, etc.)   []  Pre-Visit Chart Review     Updates Requested / Reviewed:     []  Care Everywhere    []     []  External Sources (LabCorp, Quest, DIS, etc.)   [x]  Care Team Updated    Patient Outreach Method:    []  Telephone Outreach Completed   [] Successful   [] Left Voicemail   [] Unable to Contact (wrong number, no voicemail)  []  [facility] Portal Outreach Sent  []  Letter Outreach Mailed  [x]  Fax Sent for External Records  []  External Records Upload    Health Maintenance Topics Addressed and Outreach Outcomes / Actions Taken:        [x]      Breast Cancer Screening []  Mammo Scheduled      [x]  External Records Requested     []  Added Reminder to Complete to Upcoming Primary Care Appt Notes     []  Patient Declined     []  Patient Will Call Back to Schedule     []  Patient Will Schedule with External Provider / Order Routed if Applicable             []       Cervical Cancer Screening []  Pap Scheduled      []  External Records Requested     []  Added Reminder to Complete to Upcoming Primary Care Appt Notes     []  Patient Declined     []  Patient Will Call Back to Schedule     []  Patient Will Schedule with External Provider               []          Colorectal Cancer Screening []  Colonoscopy Case Request or Referral Placed     []  External Records Requested     []  Added Reminder to Complete to Upcoming Primary Care Appt Notes     []  Patient Declined     []  Patient Will Call Back to Schedule     []  Patient Will Schedule with External Provider     []  Fit Kit Mailed (add the SmartPhrase under additional notes)     []  Reminded Patient to Complete Home Test             []      Diabetic Eye Exam []  Eye Camera Scheduled or Optometry Referral Placed     []  External Records Requested     []  Added Reminder to Complete to  Upcoming Primary Care Appt Notes     []  Patient Declined     []  Patient Will Call Back to Schedule     []  Patient Will Schedule with External Provider             []      Blood Pressure Control []  Primary Care Follow Up Visit Scheduled     []  Remote Blood Pressure Reading Captured     []  Added Reminder to Complete to Upcoming Primary Care Appt Notes     []  Patient Declined     []  Patient Will Call Back / Patient Will Send Portal Message with Reading     []  Patient Will Call Back to Schedule Provider Visit             []       HbA1c & Other Labs []  Lab Appt Scheduled for Due Labs     []  Primary Care Follow Up Visit Scheduled      []  Reminded Patient to Complete Home Test     []  Added Reminder to Complete to Upcoming Primary Care Appt Notes     []  Patient Declined     []  Patient Will Call Back to Schedule     []  Patient Will Schedule with External Provider / Order Routed if Applicable           []    Schedule Primary Care Appt []  Primary Care Appt Scheduled     []  Patient Declined     []  Patient Will Call Back to Schedule     []  Pt Established with External Provider & Updated Care Team             []      Medication Adherence []  Primary Care Appointment Scheduled     []  Added Reminder to Upcoming Primary Care Appt Notes     []  Patient Reminded to  Prescription     []  Patient Declined, Provider Notified if Needed     []  Sent Provider Message to Review and/or Add Exclusion to Problem List             []      Osteoporosis Screening []  DXA Appointment Scheduled     []  External Records Requested     []  Added Reminder to Complete to Upcoming Primary Care Appt Notes     []  Patient Declined     []  Patient Will Call Back to Schedule     []  Patient Will Schedule with External Provider / Order Routed if Applicable     Additional Care Coordinator Notes:         Further Action Needed If Patient Returns Outreach:

## 2023-09-14 NOTE — LETTER
AUTHORIZATION FOR RELEASE OF   CONFIDENTIAL INFORMATION    Dear [facility] Medical Records Dept ,    We are seeing Joanne Hummel, date of birth 1970, in the clinic at [address]. Jorge Parson MD is the patient's PCP. Joanne Hummel has an outstanding lab/procedure at the time we reviewed her chart. In order to help keep her health information updated, she has authorized us to request the following medical record(s):                                   ( X )  MAMMOGRAM ( Most Recent )                                            Please fax records to [facility], Casey, Michael D., MD, [phone]     If you have any questions, please contact     [facility]  Clinical Care Coordinator    [facility] / [facility]  [phone] (Phone)  [phone] (Fax)    Patient Name: Joanne Hummel  : 1970  Patient Phone #: [phone]

## 2023-09-16 ENCOUNTER — PATIENT MESSAGE (OUTPATIENT)
Dept: FAMILY MEDICINE | Facility: CLINIC | Age: 53
End: 2023-09-16

## 2023-09-16 DIAGNOSIS — M25.522 LEFT ELBOW PAIN: ICD-10-CM

## 2023-09-18 ENCOUNTER — PATIENT MESSAGE (OUTPATIENT)
Dept: FAMILY MEDICINE | Facility: CLINIC | Age: 53
End: 2023-09-18

## 2023-09-18 ENCOUNTER — PATIENT OUTREACH (OUTPATIENT)
Dept: ADMINISTRATIVE | Facility: HOSPITAL | Age: 53
End: 2023-09-18
Payer: OTHER GOVERNMENT

## 2023-09-18 ENCOUNTER — OFFICE VISIT (OUTPATIENT)
Dept: SPINE | Facility: CLINIC | Age: 53
End: 2023-09-18
Payer: OTHER GOVERNMENT

## 2023-09-18 VITALS — HEIGHT: 66 IN | WEIGHT: 170.19 LBS | BODY MASS INDEX: 27.35 KG/M2

## 2023-09-18 DIAGNOSIS — M54.41 CHRONIC BILATERAL LOW BACK PAIN WITH BILATERAL SCIATICA: Primary | ICD-10-CM

## 2023-09-18 DIAGNOSIS — M25.522 LEFT ELBOW PAIN: ICD-10-CM

## 2023-09-18 DIAGNOSIS — G89.29 CHRONIC BILATERAL LOW BACK PAIN WITH BILATERAL SCIATICA: Primary | ICD-10-CM

## 2023-09-18 DIAGNOSIS — M54.42 CHRONIC BILATERAL LOW BACK PAIN WITH BILATERAL SCIATICA: Primary | ICD-10-CM

## 2023-09-18 DIAGNOSIS — M54.2 NECK PAIN: ICD-10-CM

## 2023-09-18 PROCEDURE — 99204 OFFICE O/P NEW MOD 45 MIN: CPT | Mod: S$GLB,,, | Performed by: PHYSICAL MEDICINE & REHABILITATION

## 2023-09-18 PROCEDURE — 99204 PR OFFICE/OUTPT VISIT, NEW, LEVL IV, 45-59 MIN: ICD-10-PCS | Mod: S$GLB,,, | Performed by: PHYSICAL MEDICINE & REHABILITATION

## 2023-09-18 RX ORDER — MELOXICAM 7.5 MG/1
7.5 TABLET ORAL 2 TIMES DAILY PRN
Qty: 30 TABLET | Refills: 1 | Status: CANCELLED | OUTPATIENT
Start: 2023-09-18

## 2023-09-18 RX ORDER — MELOXICAM 7.5 MG/1
7.5 TABLET ORAL 2 TIMES DAILY PRN
Qty: 30 TABLET | Refills: 1 | Status: SHIPPED | OUTPATIENT
Start: 2023-09-18 | End: 2023-10-21 | Stop reason: SDUPTHER

## 2023-09-18 RX ORDER — HYDROXYZINE HYDROCHLORIDE 10 MG/1
10 TABLET, FILM COATED ORAL 3 TIMES DAILY PRN
Qty: 90 TABLET | Refills: 11 | Status: SHIPPED | OUTPATIENT
Start: 2023-09-18

## 2023-09-18 NOTE — TELEPHONE ENCOUNTER
LMOR informing medication was sent to pharmacy on 09/13 for 30 day supply. Call if has questions.

## 2023-09-18 NOTE — PROGRESS NOTES
SUBJECTIVE:    Patient ID: Joanne Hummel is a 52 y.o. female.    Chief Complaint: Neck Pain    This is a 52-year-old woman who sees Alyx English nurse practitioner for primary care.  Denies any chronic major medical problems.  Long history of neck and low back pain.  Presents with complaints of posterior neck discomfort at the cervical thoracic junction without radicular symptoms and low back pain at the lumbosacral junction radiates into the posterior portion of both legs to the mid thigh level.  Denies bowel or bladder dysfunction fever chills sweats or unexpected weight loss.  She is had chiropractic treatment and massage therapy in the distant past.  No recent treatment.  I reviewed a cervical MRI report from 2019 which describes mild degenerative changes of the cervical spine.  I also reviewed lumbar MRI report done around the same time which again describes mild degenerative changes.  I do not have benefit of the films.  I did review an x-ray of the cervical spine done 08/22/2023 which shows expected degenerative changes in the midcervical region.  Her current pain level is 6/10 and interferes with her quality of life in terms of activities of daily living recreation and social activities          Past Medical History:   Diagnosis Date    Allergy     Anemia     Anxiety     Arthritis     Asthma     Depression     Personal history of colonic polyps 01/13/2020    Colonoscopy Gila Dias MD     Social History     Socioeconomic History    Marital status:    Tobacco Use    Smoking status: Former     Current packs/day: 1.50     Average packs/day: 1.5 packs/day for 7.7 years (11.6 ttl pk-yrs)     Types: Cigarettes     Start date: 2016     Quit date: 1983    Smokeless tobacco: Never   Substance and Sexual Activity    Alcohol use: Yes     Comment: social    Drug use: Never    Sexual activity: Yes     Partners: Male     Past Surgical History:   Procedure Laterality Date    AUGMENTATION OF BREAST  "Bilateral     colonscopy      comestic       EYE SURGERY      TONSILLECTOMY       Family History   Problem Relation Age of Onset    Arthritis Mother     Skin cancer Mother     Alcohol abuse Father     COPD Maternal Grandfather     Alcohol abuse Paternal Grandfather      Vitals:    09/18/23 1418   Weight: 77.2 kg (170 lb 3.1 oz)   Height: 5' 5.75" (1.67 m)       Review of Systems   Constitutional:  Negative for chills, diaphoresis, fatigue, fever and unexpected weight change.   HENT:  Negative for trouble swallowing.    Eyes:  Negative for visual disturbance.   Respiratory:  Negative for shortness of breath.    Cardiovascular:  Negative for chest pain.   Gastrointestinal:  Negative for abdominal pain, constipation, nausea and vomiting.   Genitourinary:  Negative for difficulty urinating.   Musculoskeletal:  Negative for arthralgias, back pain, gait problem, joint swelling, myalgias, neck pain and neck stiffness.   Neurological:  Negative for dizziness, speech difficulty, weakness, light-headedness, numbness and headaches.          Objective:      Physical Exam  Neurological:      Mental Status: She is alert and oriented to person, place, and time.      Comments: She is awake and in no acute distress  Mild tenderness palpation posterior cervical and lumbar paraspinous musculature with no external lesions or palpable masses noted  Forward flexion of the lumbar spine is normal and painless.  Extension at 10° causes mild pain at the lumbosacral junction  She can heel and toe walk normally  Reflexes- +1-+2 reflexes at the following:   C5-Biceps   C6-Brachioradialis   C7-Triceps   L3/4-Patellar   S1-Achilles   Deidra sign negative bilaterally  Strength testing- 5/5 strength in the following muscle groups:  C5-Elbow flexion  C6-Wrist extension  C7-Elbow extension  C8-Finger flexion  T1-Finger abduction  L2-Hip flexion  L3-Knee extension  L4-Ankle dorsiflexion  L5-Great toe extension  S1-Ankle plantar flexion              "       Assessment:       1. Chronic bilateral low back pain with bilateral sciatica    2. Neck pain    3. Left elbow pain           Plan:     She has a nonfocal neurological examination and no historical red flags.  I suspect she has neck and low back pain on basis of degenerative disc disease and facet arthropathy.  She has pain with facet loading.  Imaging has been benign.  I am recommending physical therapy for her neck and back pain.  Consider updated MRI if she fails to respond.      Chronic bilateral low back pain with bilateral sciatica  -     Ambulatory referral/consult to Physical/Occupational Therapy; Future; Expected date: 09/25/2023    Neck pain  -     Ambulatory referral/consult to [facility]  -     Ambulatory referral/consult to Physical/Occupational Therapy; Future; Expected date: 09/25/2023    Left elbow pain  -     Ambulatory referral/consult to [facility]

## 2023-09-20 ENCOUNTER — CLINICAL SUPPORT (OUTPATIENT)
Dept: REHABILITATION | Facility: HOSPITAL | Age: 53
End: 2023-09-20
Payer: OTHER GOVERNMENT

## 2023-09-20 DIAGNOSIS — M53.82 IMPAIRED RANGE OF MOTION OF CERVICAL SPINE: ICD-10-CM

## 2023-09-20 DIAGNOSIS — R29.898 SHOULDER WEAKNESS: ICD-10-CM

## 2023-09-20 DIAGNOSIS — M54.42 CHRONIC BILATERAL LOW BACK PAIN WITH BILATERAL SCIATICA: ICD-10-CM

## 2023-09-20 DIAGNOSIS — M54.2 NECK PAIN: ICD-10-CM

## 2023-09-20 DIAGNOSIS — G89.29 CHRONIC BILATERAL LOW BACK PAIN WITH BILATERAL SCIATICA: ICD-10-CM

## 2023-09-20 DIAGNOSIS — M54.41 CHRONIC BILATERAL LOW BACK PAIN WITH BILATERAL SCIATICA: ICD-10-CM

## 2023-09-20 PROCEDURE — 97140 MANUAL THERAPY 1/> REGIONS: CPT | Mod: PN

## 2023-09-20 PROCEDURE — 97161 PT EVAL LOW COMPLEX 20 MIN: CPT | Mod: PN

## 2023-09-20 PROCEDURE — 97530 THERAPEUTIC ACTIVITIES: CPT | Mod: PN

## 2023-09-22 PROBLEM — M53.82 IMPAIRED RANGE OF MOTION OF CERVICAL SPINE: Status: ACTIVE | Noted: 2023-09-22

## 2023-09-22 PROBLEM — R29.898 SHOULDER WEAKNESS: Status: ACTIVE | Noted: 2023-09-22

## 2023-09-22 NOTE — PLAN OF CARE
[facility]   Physical Therapy Initial Evaluation      Name: Joanne Hummel  Clinic Number: [id number]    Therapy Diagnosis:   Encounter Diagnoses   Name Primary?    Neck pain     Chronic bilateral low back pain with bilateral sciatica     Shoulder weakness     Impaired range of motion of cervical spine         Physician: Jorge Pineda MD     Physician Orders: PT Eval and Treat: consider for health back  Medical Diagnosis from Referral:   M54.2 (ICD-10-CM) - Neck pain   M54.42,M54.41,G89.29 (ICD-10-CM) - Chronic bilateral low back pain with bilateral sciatica     Evaluation Date: 9/20/2023  Authorization Period Expiration: 12/31/2023  Plan of Care Expiration: 11/20/2023  Progress Note Due: 10/20/2023  Date of Surgery: N/A  Visit # / Visits authorized: 1/ 1   FOTO: 1/ 3    Precautions: Standard     Time In: 5:00  Time Out: 5:55  Total Billable Time: 55 minutes    Subjective     Date of onset: 2-3 years ago:     History of current condition - Mercedez reports: She has waxing and waning neck pain; No treatment prior for her neck. Sometimes the pain is randomly upcoming.  Was going to the chiropractor; for her back and improved her back symptoms     Falls: none    Imaging: x-ray: Mild degenerative discopathy at C4-5, C5-6, and C6-7.    Prior Therapy: Yes but not for this   Social History:  lives with their spouse  Occupation: Not working at this time   Prior Level of Function: independent with all task; biggest complaint is the pain   Current Level of Function: as above    Pain:  Current 2/10, worst 8/10, best 1/10   Location: bilateral neck and lumbar spine (CT junction)    Description: aching/constant pain  Aggravating Factors: standing for longed periods of time  Easing Factors: sitting with a pillow undernearth her legs    Patients goals: pain relief      Medical History:   Past Medical History:   Diagnosis Date    Allergy     Anemia     Anxiety     Arthritis     Asthma     Depression      Personal history of colonic polyps 01/13/2020    Colonoscopy Gila Dias MD       Surgical History:   Joanne Hummel  has a past surgical history that includes Augmentation of breast (Bilateral); colonscopy; comestic ; Eye surgery; and Tonsillectomy.    Medications:   Joanne has a current medication list which includes the following prescription(s): calcipotriene, calcium-vitamin d, collagen/biotin/ascorbic acid, dha/epa/lipotropic agent/vit e, diclofenac sodium, folic acid, hydroxyzine hcl, meloxicam, metoprolol succinate, omeprazole, sertraline, and tretinoin.    Allergies:   Review of patient's allergies indicates:   Allergen Reactions    Epinephrine Palpitations        Objective      Observation: hinging from lower cervical spine, incrased motion with shoulder assist on the Right arden     Posture: depressed shoulder on the Right, scapular downward rotation, rounded Right shoulder     Cervical Range of Motion:    Degrees Pain Normal   Flexion 50 N   80-90 deg   Extension 70 Y   normal ROM: 70-80 deg   Right Rotation 65 N   70-90 degrees   Left Rotation 60 Y   70-90 degrees   Right Side Bending 50 N 20-45 degrees   Left Side Bending 50 N 20-45 degrees      Shoulder Range of Motion:   Shoulder Left Right   Flexion 180 180   Abduction 180 180   ER 90 90   IR 60 60     Upper Extremity Strength  (R) UE  (L) UE    Shoulder flexion: 5/5 Shoulder flexion: 5/5   Shoulder Abduction: 5/5 Shoulder abduction: 5/5   Shoulder ER 5/5 Shoulder ER 5/5   Shoulder IR 5/5 Shoulder IR 5/5   Elbow flexion: 5/5 Elbow flexion: 5/5   Elbow extension: 5/5 Elbow extension: 5/5   Wrist flexion: 5/5 Wrist flexion: 5/5   Wrist extension: 5/5 Wrist extension: 5/5   Serratus Anterior 3+/5 Serratus Anterior  3+/5   Lower Trap 3-/5 Lower Trap 3-/5   Middle Trap 3-/5 Middle Trap 3-/5       Special Tests:  Distraction -   Spurlings -   Vertebral Artery -   Stone -   Lateral Flexion Alar Ligament -   Transverse Ligament -   Shoulder  Abduction Test -   Quadrant Testing -     Cervical joint mobility:    C0-C1 Flex/Ext 15 15 degrees   C0-C1 Sidebending 5 5 degrees    C1-2 Rotation 45 45 degrees   C1-3 Rotation 60 60 degrees    Side glides  Side glides           Reflexes:  -Bicep (C5): 2+  -Brachioradialis (C6): 2+  -Tricep (C7): 2+    Thoracic mobility: Decreased PA mobility     Palpation: No tenderness to palpation noted       Sensation: intact to BUE              Treatment     Total Treatment time (time-based codes) separate from Evaluation: 20 minutes     Mercedez received the treatments listed below:        manual therapy techniques: Joint mobilizations were applied to the: cervical and thoracic spine for 10 minutes, including:  CT gapping  Thoracic PA    therapeutic activities to improve functional performance for 10  minutes, including:  Thoracic extension x15   Shoulder shrug x10, 5 second hold   SA wall slides, x20       Patient Education and Home Exercises     Education provided:   - HEP education  - POC education  - Purpose of healthy back    Written Home Exercises Provided: yes. Exercises were reviewed and Mercedez was able to demonstrate them prior to the end of the session.  Mercedez demonstrated fair  understanding of the education provided. See EMR under Patient Instructions for exercises provided during therapy sessions.    Assessment     Joanne is a 52 y.o. female referred to outpatient Physical Therapy with a medical diagnosis of M54.2 (ICD-10-CM) - Neck pain and M54.42,M54.41,G89.29 (ICD-10-CM) - Chronic bilateral low back pain with bilateral sciatica. Patient presents with abnormal posture and decreased shoulder strength. These deficits are placing increased load onto Joanne's cervical spine and causing abnormal loading of her neck. These deficits are causing her pain with her ADLs, leisurely task and day to day task. Her symptoms were alleviated with elevating her shoulder passively. Joanne has increased complaints of low back pain;  further assessment at next visit. Joanne makes a good candidate for skilled Physical Therapy to improve the above listed deficits.      Patient prognosis is Good.   Patient will benefit from skilled outpatient Physical Therapy to address the deficits stated above and in the chart below, provide patient /family education, and to maximize patientt's level of independence.     Plan of care discussed with patient: Yes  Patient's spiritual, cultural and educational needs considered and patient is agreeable to the plan of care and goals as stated below:     Anticipated Barriers for therapy: none noted     Medical Necessity is demonstrated by the following  History  Co-morbidities and personal factors that may impact the plan of care [x] LOW: no personal factors / co-morbidities  [] MODERATE: 1-2 personal factors / co-morbidities  [] HIGH: 3+ personal factors / co-morbidities    Moderate / High Support Documentation:   Co-morbidities affecting plan of care:     Personal Factors:   no deficits     Examination  Body Structures and Functions, activity limitations and participation restrictions that may impact the plan of care [x] LOW: addressing 1-2 elements  [] MODERATE: 3+ elements  [] HIGH: 4+ elements (please support below)    Moderate / High Support Documentation:      Clinical Presentation [x] LOW: stable  [] MODERATE: Evolving  [] HIGH: Unstable     Decision Making/ Complexity Score: low       Goals:  Short Term Goals: 4 weeks. Pt agrees with goals set.  Pt will demonstrate independence and compliance with initial HEP to improve independence and symptom management.   Pt will report cervical pain </= 1/10 on exertion with rotation and extension to demonstrate improved condition and ability to look left and right, drive and complete ADLs.    Pt will improve MMT of scapular upward rotators to >/= 3+/5 to improve tolerance for cervical range of motion and decrease the load placed on her cervical spine.    Pt will improve  cervical rotation ROM by >= 20 % to improve tolerance for driving and completing ADLs.      Long Term Goals: 8 weeks. Pt agrees with goals set.   Pt will demonstrate independence and compliance with final HEP to continue managing symptoms and developing mobility, motor control and strength.    Pt will improve FOTO score to </= 37% limited to demonstrate improved functional mobility.    Pt will report neck pain </= 0/10 at rest  with leisurely task and sleeping to demonstrate improved condition and ability to manage her symptoms.    Pt will improve MMT of periscapular musculature to >/= 4-/5 to improve tolerance for static posture and decrease the demand on the cervical spine.    Pt will improve cervical rotation ROM to be full and pain free.  Pt goal: pain relief      Plan     Plan of care Certification: 9/20/2023 to 11/20/2023.    Outpatient Physical Therapy 2 times weekly for 8 weeks to include the following interventions: Electrical Stimulation NMES, Gait Training, Manual Therapy, Moist Heat/ Ice, Neuromuscular Re-ed, Patient Education, Self Care, Therapeutic Activities, and Therapeutic Exercise.     Benji Leon, PT, DPT        Physician's Signature: _________________________________________ Date: ________________

## 2023-09-26 ENCOUNTER — CLINICAL SUPPORT (OUTPATIENT)
Dept: REHABILITATION | Facility: HOSPITAL | Age: 53
End: 2023-09-26
Payer: OTHER GOVERNMENT

## 2023-09-26 DIAGNOSIS — R29.898 SHOULDER WEAKNESS: Primary | ICD-10-CM

## 2023-09-26 DIAGNOSIS — M53.82 IMPAIRED RANGE OF MOTION OF CERVICAL SPINE: ICD-10-CM

## 2023-09-26 PROCEDURE — 97112 NEUROMUSCULAR REEDUCATION: CPT | Mod: PN

## 2023-09-26 PROCEDURE — 97110 THERAPEUTIC EXERCISES: CPT | Mod: PN

## 2023-09-26 NOTE — PROGRESS NOTES
[facility]   Physical Therapy Treatment Note     Name: Joanne Hummel  Clinic Number: [id number]    Therapy Diagnosis:   Encounter Diagnoses   Name Primary?    Shoulder weakness Yes    Impaired range of motion of cervical spine      Physician: Jorge Pineda MD    Visit Date: 9/26/2023    Physician Orders: PT Eval and Treat: consider for health back  Medical Diagnosis from Referral:   M54.2 (ICD-10-CM) - Neck pain   M54.42,M54.41,G89.29 (ICD-10-CM) - Chronic bilateral low back pain with bilateral sciatica      Evaluation Date: 9/20/2023  Authorization Period Expiration: 12/31/2023  Plan of Care Expiration: 11/20/2023  Progress Note Due: 10/20/2023  Date of Surgery: N/A  Visit # / Visits authorized: 2/ 15  FOTO: 1/ 3     Precautions: Standard     PTA Visit #: 0/5     FOTO first follow up:   FOTO second follow up:     Time In: 1:00  Time Out: 1:57  Total Billable Time: 57 minutes    SUBJECTIVE     Pt reports: Her neck is feeling a lot better; her low back is causing her some trouble.  She was compliant with home exercise program.  Response to previous treatment: improved neck symptoms  Functional change: as above    Pain: 1/10  Location: bilateral neck      OBJECTIVE     Objective Measures updated at progress report unless specified.   Decreased thoracic PA T1-T12    Lumbar flexion: less than 25% limited   Lumbar extension: less than 25% limited  Lumbar side bending Right: less than 25% limited  Lumbar side bending Left: less than 25% limited  Quadrant testing: negative    hip abduction MMT: Right: 4-/5, Left: 4-/5  Hip extension MMT: Right: 3+/5, Left: 3+/5  Hip flexion MMT: Right: 5/5, Left: 5/5     Reflexes: L5 2+, S1 2+, bilaterally  Treatment     Mercedez received the treatments listed below:      therapeutic exercises to develop strength, endurance, ROM, flexibility, posture, and core stabilization for 23 minutes including:  Assessment as seen above  Bridge: 3 x 10    manual therapy  techniques: Joint mobilizations were applied to the: thoracic spine for 00 minutes, including:      neuromuscular re-education activities to improve: Balance, Coordination, Kinesthetic, and Sense for 34 minutes. The following activities were included:  Dead bug x20 bilaterally  Clamshell: 3 x 12  Glute set: 2 x20 bilaterally  Paloff press: 2 x 15 bilaterall, 5  second holds  Farmer carry: 10#, 20 Yds down and back is 1, x3 bilaterally    therapeutic activities to improve functional performance for 00  minutes, including:      Patient Education and Home Exercises     Home Exercises Provided and Patient Education Provided     Education provided:   - HEP education  - POC education    Written Home Exercises Provided: Patient instructed to cont prior HEP. Exercises were reviewed and Mercedez was able to demonstrate them prior to the end of the session.  Mercedez demonstrated good  understanding of the education provided. See EMR under Patient Instructions for exercises provided during therapy sessions    ASSESSMENT     Mercedez completed therapy with no complications. She reported to therapy with improved cervical spine symptoms. She was adamant about having her lumbar spine evaluated; objectively she has good range of motion and no true deficits noted. She has hip girdle weakness that can be improved. Therapy focused on improving her core strength and global hip stability. At this time she is doing well and therapy will look to advance as necessary.     Mercedez Is progressing well towards her goals.   Pt prognosis is Good.     Pt will continue to benefit from skilled outpatient physical therapy to address the deficits listed in the problem list box on initial evaluation, provide pt/family education and to maximize pt's level of independence in the home and community environment.     Pt's spiritual, cultural and educational needs considered and pt agreeable to plan of care and goals.     Anticipated barriers to physical  therapy: none noted    Goals:  Short Term Goals: 4 weeks. Pt agrees with goals set.  Pt will demonstrate independence and compliance with initial HEP to improve independence and symptom management.   Pt will report cervical pain </= 1/10 on exertion with rotation and extension to demonstrate improved condition and ability to look left and right, drive and complete ADLs.    Pt will improve MMT of scapular upward rotators to >/= 3+/5 to improve tolerance for cervical range of motion and decrease the load placed on her cervical spine.    Pt will improve cervical rotation ROM by >= 20 % to improve tolerance for driving and completing ADLs.       Long Term Goals: 8 weeks. Pt agrees with goals set.   Pt will demonstrate independence and compliance with final HEP to continue managing symptoms and developing mobility, motor control and strength.    Pt will improve FOTO score to </= 37% limited to demonstrate improved functional mobility.    Pt will report neck pain </= 0/10 at rest  with leisurely task and sleeping to demonstrate improved condition and ability to manage her symptoms.    Pt will improve MMT of periscapular musculature to >/= 4-/5 to improve tolerance for static posture and decrease the demand on the cervical spine.    Pt will improve cervical rotation ROM to be full and pain free.  Pt goal: pain relief        PLAN     Develop core strength and hip stability to improve low back pain: improve spinal mobility and periscapular strength for C-spine pain    Benji Leon, PT, DPT

## 2023-10-02 ENCOUNTER — PATIENT MESSAGE (OUTPATIENT)
Dept: FAMILY MEDICINE | Facility: CLINIC | Age: 53
End: 2023-10-02

## 2023-10-02 DIAGNOSIS — K08.89 TOOTHACHE: Primary | ICD-10-CM

## 2023-10-02 RX ORDER — AMOXICILLIN AND CLAVULANATE POTASSIUM 875; 125 MG/1; MG/1
1 TABLET, FILM COATED ORAL EVERY 12 HOURS
Qty: 20 TABLET | Refills: 0 | Status: SHIPPED | OUTPATIENT
Start: 2023-10-02 | End: 2023-10-12

## 2023-10-05 ENCOUNTER — PATIENT MESSAGE (OUTPATIENT)
Dept: FAMILY MEDICINE | Facility: CLINIC | Age: 53
End: 2023-10-05

## 2023-10-05 DIAGNOSIS — I10 HYPERTENSION, UNSPECIFIED TYPE: ICD-10-CM

## 2023-10-05 DIAGNOSIS — K21.9 GASTROESOPHAGEAL REFLUX DISEASE, UNSPECIFIED WHETHER ESOPHAGITIS PRESENT: Primary | ICD-10-CM

## 2023-10-06 RX ORDER — SERTRALINE HYDROCHLORIDE 200 MG/1
200 CAPSULE ORAL DAILY
Qty: 90 CAPSULE | Refills: 1 | Status: CANCELLED | OUTPATIENT
Start: 2023-10-06

## 2023-10-08 RX ORDER — FOLIC ACID 1 MG/1
1 TABLET ORAL DAILY
Qty: 90 TABLET | Refills: 1 | Status: SHIPPED | OUTPATIENT
Start: 2023-10-08

## 2023-10-08 RX ORDER — SERTRALINE HYDROCHLORIDE 100 MG/1
200 TABLET, FILM COATED ORAL DAILY
Qty: 180 TABLET | Refills: 1 | Status: SHIPPED | OUTPATIENT
Start: 2023-10-08

## 2023-10-08 RX ORDER — OMEPRAZOLE 20 MG/1
20 CAPSULE, DELAYED RELEASE ORAL DAILY
Qty: 90 CAPSULE | Refills: 1 | Status: SHIPPED | OUTPATIENT
Start: 2023-10-08

## 2023-10-08 RX ORDER — METOPROLOL SUCCINATE 25 MG/1
25 TABLET, EXTENDED RELEASE ORAL DAILY
Qty: 90 TABLET | Refills: 1 | Status: SHIPPED | OUTPATIENT
Start: 2023-10-08

## 2023-10-09 ENCOUNTER — PATIENT MESSAGE (OUTPATIENT)
Dept: FAMILY MEDICINE | Facility: CLINIC | Age: 53
End: 2023-10-09

## 2023-10-12 ENCOUNTER — TELEPHONE (OUTPATIENT)
Dept: PSYCHIATRY | Facility: CLINIC | Age: 53
End: 2023-10-12
Payer: OTHER GOVERNMENT

## 2023-10-12 NOTE — TELEPHONE ENCOUNTER
Called to verify patient would like to be placed on the wait list. No answer, left voice message, sent my chart message.

## 2023-10-21 DIAGNOSIS — M25.522 LEFT ELBOW PAIN: ICD-10-CM

## 2023-10-23 RX ORDER — MELOXICAM 7.5 MG/1
7.5 TABLET ORAL DAILY
Qty: 30 TABLET | Refills: 1 | Status: SHIPPED | OUTPATIENT
Start: 2023-10-23 | End: 2023-12-11 | Stop reason: SDUPTHER

## 2023-10-23 NOTE — TELEPHONE ENCOUNTER
Last OV 09/01/2023  Next OV none    Last refill 09/18/2023     Pended to Martha IGNACIO-meloxicam    [facility] [address]

## 2023-10-26 ENCOUNTER — PATIENT MESSAGE (OUTPATIENT)
Dept: FAMILY MEDICINE | Facility: CLINIC | Age: 53
End: 2023-10-26

## 2023-10-27 ENCOUNTER — PATIENT MESSAGE (OUTPATIENT)
Dept: FAMILY MEDICINE | Facility: CLINIC | Age: 53
End: 2023-10-27

## 2023-11-03 ENCOUNTER — PATIENT MESSAGE (OUTPATIENT)
Dept: RHEUMATOLOGY | Facility: CLINIC | Age: 53
End: 2023-11-03
Payer: OTHER GOVERNMENT

## 2023-11-06 ENCOUNTER — PATIENT MESSAGE (OUTPATIENT)
Dept: FAMILY MEDICINE | Facility: CLINIC | Age: 53
End: 2023-11-06

## 2023-11-13 ENCOUNTER — PATIENT MESSAGE (OUTPATIENT)
Dept: FAMILY MEDICINE | Facility: CLINIC | Age: 53
End: 2023-11-13

## 2023-11-29 ENCOUNTER — OFFICE VISIT (OUTPATIENT)
Dept: OBSTETRICS AND GYNECOLOGY | Facility: CLINIC | Age: 53
End: 2023-11-29
Payer: OTHER GOVERNMENT

## 2023-11-29 VITALS
SYSTOLIC BLOOD PRESSURE: 130 MMHG | WEIGHT: 173.31 LBS | DIASTOLIC BLOOD PRESSURE: 66 MMHG | HEIGHT: 66 IN | BODY MASS INDEX: 27.85 KG/M2

## 2023-11-29 DIAGNOSIS — N90.89 VULVAR LUMP: Primary | ICD-10-CM

## 2023-11-29 PROCEDURE — 99213 OFFICE O/P EST LOW 20 MIN: CPT | Mod: PBBFAC,PO | Performed by: OBSTETRICS & GYNECOLOGY

## 2023-11-29 PROCEDURE — 99999 PR PBB SHADOW E&M-EST. PATIENT-LVL III: ICD-10-PCS | Mod: PBBFAC,,, | Performed by: OBSTETRICS & GYNECOLOGY

## 2023-11-29 PROCEDURE — 99999 PR PBB SHADOW E&M-EST. PATIENT-LVL III: CPT | Mod: PBBFAC,,, | Performed by: OBSTETRICS & GYNECOLOGY

## 2023-11-29 PROCEDURE — 99204 OFFICE O/P NEW MOD 45 MIN: CPT | Mod: S$PBB,,, | Performed by: OBSTETRICS & GYNECOLOGY

## 2023-11-29 PROCEDURE — 99204 PR OFFICE/OUTPT VISIT, NEW, LEVL IV, 45-59 MIN: ICD-10-PCS | Mod: S$PBB,,, | Performed by: OBSTETRICS & GYNECOLOGY

## 2023-11-29 RX ORDER — CLOBETASOL PROPIONATE 0.5 MG/G
OINTMENT TOPICAL 2 TIMES DAILY
COMMUNITY
Start: 2023-09-15

## 2023-11-29 NOTE — PROGRESS NOTES
Subjective:   Chief Complaint:  Cyst (Cyst on left labia)       Patient ID: Joanne Hummel is a  53 y.o. female.    HRT: None    Date: 2023    The patient presents today due to the following:    She recently felt a hard small lump on the left labia.  She reports that the area is no longer present but due to her previous concerns would like evaluation.    No pelvic pain.    No postmenopausal bleeding.    Family history is negative for breast, gyn or colon cancers.    GYN & OB History  No LMP recorded (lmp unknown). Patient is postmenopausal.     Date of Last Pap: 5/15/2023  OB History          0    Para   0    Term   0       0    AB   0    Living   0         SAB   0    IAB   0    Ectopic   0    Multiple   0    Live Births   0                 Allergies:   Review of patient's allergies indicates:   Allergen Reactions    Epinephrine Palpitations       Past Medical History:   Diagnosis Date    Allergy     Anemia     Anxiety     Arthritis     Depression     Personal history of colonic polyps 2020    Colonoscopy Gila Dias MD       Past Surgical History:   Procedure Laterality Date    AUGMENTATION OF BREAST Bilateral     colonscopy      EYE SURGERY      TONSILLECTOMY         Medications    Current Outpatient Medications:     calcium-vitamin D 600 mg-10 mcg (400 unit) Tab, Take by mouth., Disp: , Rfl:     clobetasol 0.05% (TEMOVATE) 0.05 % Oint, Apply topically 2 (two) times daily., Disp: , Rfl:     COLLAGEN-BIOTIN-ASCORBIC ACID ORAL, Take 1 capsule by mouth once daily., Disp: , Rfl:     DHA-EPA-LIPOTROPIC AGENT-VIT E ORAL, , Disp: , Rfl:     folic acid (FOLVITE) 1 MG tablet, Take 1 tablet (1 mg total) by mouth once daily., Disp: 90 tablet, Rfl: 1    hydrOXYzine HCL (ATARAX) 10 MG Tab, Take 1 tablet (10 mg total) by mouth 3 (three) times daily as needed for Anxiety., Disp: 90 tablet, Rfl: 11    meloxicam (MOBIC) 7.5 MG tablet, Take 1 tablet (7.5 mg total) by mouth once daily., Disp:  30 tablet, Rfl: 1    metoprolol succinate (TOPROL XL) 25 MG 24 hr tablet, Take 1 tablet (25 mg total) by mouth once daily., Disp: 90 tablet, Rfl: 1    omeprazole (PRILOSEC) 20 MG capsule, Take 1 capsule (20 mg total) by mouth once daily., Disp: 90 capsule, Rfl: 1    sertraline (ZOLOFT) 100 MG tablet, Take 2 tablets (200 mg total) by mouth once daily., Disp: 180 tablet, Rfl: 1    sertraline 200 mg Cap, , Disp: , Rfl:     tretinoin (RETIN-A) 0.05 % cream, , Disp: , Rfl:     calcipotriene (DOVONOX) 0.005 % ointment, , Disp: , Rfl:      Social History     Tobacco Use    Smoking status: Former     Current packs/day: 1.50     Average packs/day: 1.5 packs/day for 7.9 years (11.9 ttl pk-yrs)     Types: Cigarettes     Start date: 2016     Quit date: 1983    Smokeless tobacco: Never   Substance Use Topics    Alcohol use: Yes     Comment: social    Drug use: Never       Family History   Problem Relation Age of Onset    Arthritis Mother     Skin cancer Mother     Alcohol abuse Father     COPD Maternal Grandfather     Alcohol abuse Paternal Grandfather        Review of Systems (at today's evaluation)  Review of Systems   Constitutional:  Negative for fever and unexpected weight change.   Respiratory: Negative.     Cardiovascular:  Negative for chest pain and palpitations.   Gastrointestinal:  Negative for nausea and vomiting.   Genitourinary:  Negative for dysuria, hematuria and urgency.        Gyn as per HPI   Neurological:  Negative for headaches.        Objective:      Vitals:    11/29/23 1438   BP: 130/66     Physical Exam:   Constitutional: She appears well-developed and well-nourished.    HENT:   Head: Normocephalic.     Neck: No thyroid mass present.    Cardiovascular:  Normal rate.             Pulmonary/Chest: Effort normal. No respiratory distress.        Abdominal: Soft. There is no abdominal tenderness.     Genitourinary:    Inguinal canal, uterus, right adnexa and left adnexa normal.      Pelvic exam was performed  with patient supine.   The external female genitalia was normal.   No external genitalia lesions identified,Cervix is normal. Right adnexum displays no mass and no tenderness. Left adnexum displays no mass and no tenderness. No tenderness or bleeding in the vagina. Vaginal atrophy noted. Uterus is not tender. Normal urethral meatus.Urethra findings: no tendernessBladder findings: no bladder tenderness          Musculoskeletal: Normal range of motion.      Right lower leg: No edema.      Left lower leg: No edema.      Lymphadenopathy:     She has no cervical adenopathy. No inguinal adenopathy noted on the right or left side.    Neurological: She is alert.    Skin: Skin is warm and dry.    Psychiatric: Mood normal.         Assessment:        1. Vulvar lump        Plan:      Vulvar lump         Follow up for as needed / for any GYN related issues.     The above was reviewed discussed with the patient.    We evaluated the area of concern but no visible or palpable abnormalities are noted.    We discussed the fact that the area of concern is a very glandular area and at times gland can become clogged form palpable masses but subsequently resolved.  The patient was reassured by the findings on physical exam.    The patient's questions were answered, and she is in agreement with the current plan.     Prasanth Lord MD  Department OBGYN  [facility]

## 2023-12-11 DIAGNOSIS — M25.522 LEFT ELBOW PAIN: ICD-10-CM

## 2023-12-12 RX ORDER — MELOXICAM 7.5 MG/1
7.5 TABLET ORAL DAILY
Qty: 30 TABLET | Refills: 1 | Status: SHIPPED | OUTPATIENT
Start: 2023-12-12 | End: 2024-02-12 | Stop reason: SDUPTHER

## 2023-12-26 ENCOUNTER — PATIENT MESSAGE (OUTPATIENT)
Dept: FAMILY MEDICINE | Facility: CLINIC | Age: 53
End: 2023-12-26
Payer: OTHER GOVERNMENT

## 2023-12-26 DIAGNOSIS — F10.90 ALCOHOL USE DISORDER: Primary | ICD-10-CM

## 2023-12-28 RX ORDER — DIAZEPAM 10 MG/1
TABLET ORAL
Qty: 11 TABLET | Refills: 0 | Status: SHIPPED | OUTPATIENT
Start: 2023-12-28

## 2023-12-28 RX ORDER — NALTREXONE HYDROCHLORIDE 50 MG/1
50 TABLET, FILM COATED ORAL DAILY
Qty: 30 TABLET | Refills: 0 | Status: SHIPPED | OUTPATIENT
Start: 2023-12-28 | End: 2024-01-27

## 2023-12-28 NOTE — TELEPHONE ENCOUNTER
Please advise patient that if she wants to use valium to avoid withdrawal symptoms or treat withdrawal symptoms, the following is the fixed dose regimen that we use:   Day 1: 10mg every 6 hours Day 2: 10mg every 8 hours Day 3: 10mg every 12 hours Days 4 and 5: 10mg at bedtime     She should absolutely NOT take the hydroxyzine while taking the valium, and in the future she can cut the hydroxyzine in half to avoid such sleepiness, or we can try something different altogether for insomnia.     Naltrexone has been sent. Please tell her to call us or go to the ER after hours if she has any concerning symptoms.

## 2024-01-01 ENCOUNTER — PATIENT MESSAGE (OUTPATIENT)
Dept: FAMILY MEDICINE | Facility: CLINIC | Age: 54
End: 2024-01-01
Payer: OTHER GOVERNMENT

## 2024-01-25 ENCOUNTER — PATIENT MESSAGE (OUTPATIENT)
Dept: FAMILY MEDICINE | Facility: CLINIC | Age: 54
End: 2024-01-25
Payer: OTHER GOVERNMENT

## 2024-02-12 DIAGNOSIS — M25.522 LEFT ELBOW PAIN: ICD-10-CM

## 2024-02-14 RX ORDER — MELOXICAM 7.5 MG/1
7.5 TABLET ORAL DAILY
Qty: 30 TABLET | Refills: 1 | Status: SHIPPED | OUTPATIENT
Start: 2024-02-14 | End: 2024-04-11 | Stop reason: SDUPTHER

## 2024-02-20 ENCOUNTER — OFFICE VISIT (OUTPATIENT)
Dept: FAMILY MEDICINE | Facility: CLINIC | Age: 54
End: 2024-02-20
Payer: OTHER GOVERNMENT

## 2024-02-20 VITALS
SYSTOLIC BLOOD PRESSURE: 118 MMHG | WEIGHT: 174 LBS | HEART RATE: 80 BPM | HEIGHT: 68 IN | BODY MASS INDEX: 26.37 KG/M2 | DIASTOLIC BLOOD PRESSURE: 72 MMHG

## 2024-02-20 DIAGNOSIS — M54.9 UPPER BACK PAIN: ICD-10-CM

## 2024-02-20 DIAGNOSIS — M25.50 MULTIPLE JOINT PAIN: ICD-10-CM

## 2024-02-20 DIAGNOSIS — Z13.220 SCREENING CHOLESTEROL LEVEL: ICD-10-CM

## 2024-02-20 DIAGNOSIS — F41.9 ANXIETY: ICD-10-CM

## 2024-02-20 DIAGNOSIS — F10.90 ALCOHOL USE DISORDER: ICD-10-CM

## 2024-02-20 DIAGNOSIS — R76.8 POSITIVE ANA (ANTINUCLEAR ANTIBODY): ICD-10-CM

## 2024-02-20 DIAGNOSIS — M54.2 NECK PAIN: ICD-10-CM

## 2024-02-20 DIAGNOSIS — I10 HYPERTENSION, UNSPECIFIED TYPE: Primary | ICD-10-CM

## 2024-02-20 DIAGNOSIS — F33.1 MODERATE EPISODE OF RECURRENT MAJOR DEPRESSIVE DISORDER: ICD-10-CM

## 2024-02-20 DIAGNOSIS — M13.0 INFLAMMATION OF MULTIPLE JOINTS: ICD-10-CM

## 2024-02-20 DIAGNOSIS — T85.44XS CAPSULAR CONTRACTURE OF BREAST IMPLANT, SEQUELA: ICD-10-CM

## 2024-02-20 PROCEDURE — 99214 OFFICE O/P EST MOD 30 MIN: CPT | Mod: S$GLB,,,

## 2024-02-20 RX ORDER — BUPROPION HYDROCHLORIDE 150 MG/1
150 TABLET ORAL DAILY
Qty: 30 TABLET | Refills: 11 | Status: SHIPPED | OUTPATIENT
Start: 2024-02-20 | End: 2025-02-19

## 2024-02-20 RX ORDER — ACAMPROSATE CALCIUM 333 MG/1
333 TABLET, DELAYED RELEASE ORAL 3 TIMES DAILY
COMMUNITY
End: 2024-02-23 | Stop reason: SDUPTHER

## 2024-02-20 RX ORDER — NALTREXONE HYDROCHLORIDE 50 MG/1
50 TABLET, FILM COATED ORAL DAILY
COMMUNITY
End: 2024-02-23 | Stop reason: SDUPTHER

## 2024-02-20 NOTE — PATIENT INSTRUCTIONS
Sertraline- start taking 100mg daily for 4 weeks, then take 50mg for 2 weeks. At this point let me know to prescribe the 25mg tablets so we can keep weaning.   Can start taking Wellbutrin immediately. I would take in the morning.    Referral to Dr. Disla, general surgeon, but breast specialist, in [place].    [facility] in [place] for natural remedies- evening primrose oil supposed to help with hot flashes

## 2024-02-22 ENCOUNTER — PATIENT MESSAGE (OUTPATIENT)
Dept: FAMILY MEDICINE | Facility: CLINIC | Age: 54
End: 2024-02-22
Payer: OTHER GOVERNMENT

## 2024-02-23 ENCOUNTER — PATIENT MESSAGE (OUTPATIENT)
Dept: FAMILY MEDICINE | Facility: CLINIC | Age: 54
End: 2024-02-23
Payer: OTHER GOVERNMENT

## 2024-02-23 DIAGNOSIS — F10.10 ALCOHOL ABUSE: Primary | ICD-10-CM

## 2024-02-23 DIAGNOSIS — T85.44XS CAPSULAR CONTRACTURE OF BREAST IMPLANT, SEQUELA: Primary | ICD-10-CM

## 2024-02-23 RX ORDER — NALTREXONE HYDROCHLORIDE 50 MG/1
50 TABLET, FILM COATED ORAL DAILY
Qty: 30 TABLET | Refills: 5 | Status: SHIPPED | OUTPATIENT
Start: 2024-02-23 | End: 2024-08-21

## 2024-02-23 RX ORDER — ACAMPROSATE CALCIUM 333 MG/1
333 TABLET, DELAYED RELEASE ORAL 3 TIMES DAILY
Qty: 90 TABLET | Refills: 5 | Status: SHIPPED | OUTPATIENT
Start: 2024-02-23 | End: 2024-08-21

## 2024-02-26 ENCOUNTER — TELEPHONE (OUTPATIENT)
Dept: FAMILY MEDICINE | Facility: CLINIC | Age: 54
End: 2024-02-26
Payer: OTHER GOVERNMENT

## 2024-02-26 ENCOUNTER — TELEPHONE (OUTPATIENT)
Dept: PLASTIC SURGERY | Facility: CLINIC | Age: 54
End: 2024-02-26
Payer: OTHER GOVERNMENT

## 2024-02-26 NOTE — TELEPHONE ENCOUNTER
Attempted to contact pt to discuss scheduling.  Pt was not available at the time of the call. I left a detailed VM asking pt to call PLS office at her convenience.

## 2024-02-27 ENCOUNTER — TELEPHONE (OUTPATIENT)
Dept: FAMILY MEDICINE | Facility: CLINIC | Age: 54
End: 2024-02-27
Payer: OTHER GOVERNMENT

## 2024-02-27 LAB
ALBUMIN SERPL-MCNC: 4.6 G/DL (ref 3.6–5.1)
ALBUMIN/GLOB SERPL: 1.5 (CALC) (ref 1–2.5)
ALP SERPL-CCNC: 69 U/L (ref 37–153)
ALT SERPL-CCNC: 27 U/L (ref 6–29)
ANA PAT SER IF-IMP: ABNORMAL
ANA PAT SER IF-IMP: ABNORMAL
ANA SER QL IF: POSITIVE
ANA TITR SER IF: ABNORMAL TITER
ANA TITR SER IF: ABNORMAL TITER
AST SERPL-CCNC: 24 U/L (ref 10–35)
BASOPHILS # BLD AUTO: 22 CELLS/UL (ref 0–200)
BASOPHILS NFR BLD AUTO: 0.4 %
BILIRUB SERPL-MCNC: 0.4 MG/DL (ref 0.2–1.2)
BUN SERPL-MCNC: 16 MG/DL (ref 7–25)
BUN/CREAT SERPL: NORMAL (CALC) (ref 6–22)
CALCIUM SERPL-MCNC: 10.1 MG/DL (ref 8.6–10.4)
CHLORIDE SERPL-SCNC: 101 MMOL/L (ref 98–110)
CHOLEST SERPL-MCNC: 210 MG/DL
CHOLEST/HDLC SERPL: 2.7 (CALC)
CO2 SERPL-SCNC: 24 MMOL/L (ref 20–32)
CREAT SERPL-MCNC: 0.73 MG/DL (ref 0.5–1.03)
CRP SERPL-MCNC: 3.5 MG/L
EGFR: 98 ML/MIN/1.73M2
EOSINOPHIL # BLD AUTO: 0 CELLS/UL (ref 15–500)
EOSINOPHIL NFR BLD AUTO: 0 %
ERYTHROCYTE [DISTWIDTH] IN BLOOD BY AUTOMATED COUNT: 14.1 % (ref 11–15)
ERYTHROCYTE [SEDIMENTATION RATE] IN BLOOD BY WESTERGREN METHOD: 28 MM/H
GLOBULIN SER CALC-MCNC: 3 G/DL (CALC) (ref 1.9–3.7)
GLUCOSE SERPL-MCNC: 86 MG/DL (ref 65–99)
HCT VFR BLD AUTO: 35.8 % (ref 35–45)
HDLC SERPL-MCNC: 78 MG/DL
HGB BLD-MCNC: 12.2 G/DL (ref 11.7–15.5)
LDLC SERPL CALC-MCNC: 114 MG/DL (CALC)
LYMPHOCYTES # BLD AUTO: 2030 CELLS/UL (ref 850–3900)
LYMPHOCYTES NFR BLD AUTO: 37.6 %
MCH RBC QN AUTO: 29.5 PG (ref 27–33)
MCHC RBC AUTO-ENTMCNC: 34.1 G/DL (ref 32–36)
MCV RBC AUTO: 86.7 FL (ref 80–100)
MONOCYTES # BLD AUTO: 400 CELLS/UL (ref 200–950)
MONOCYTES NFR BLD AUTO: 7.4 %
NEUTROPHILS # BLD AUTO: 2948 CELLS/UL (ref 1500–7800)
NEUTROPHILS NFR BLD AUTO: 54.6 %
NONHDLC SERPL-MCNC: 132 MG/DL (CALC)
PLATELET # BLD AUTO: 233 THOUSAND/UL (ref 140–400)
PMV BLD REES-ECKER: 9.5 FL (ref 7.5–12.5)
POTASSIUM SERPL-SCNC: 4.1 MMOL/L (ref 3.5–5.3)
PROT SERPL-MCNC: 7.6 G/DL (ref 6.1–8.1)
RBC # BLD AUTO: 4.13 MILLION/UL (ref 3.8–5.1)
RHEUMATOID FACT SERPL-ACNC: <14 IU/ML
SODIUM SERPL-SCNC: 136 MMOL/L (ref 135–146)
T4 FREE SERPL-MCNC: 1 NG/DL (ref 0.8–1.8)
TRIGL SERPL-MCNC: 81 MG/DL
TSH SERPL-ACNC: 5.87 MIU/L
WBC # BLD AUTO: 5.4 THOUSAND/UL (ref 3.8–10.8)

## 2024-02-29 LAB
APPEARANCE UR: CLEAR
BACTERIA #/AREA URNS HPF: NORMAL /HPF
BACTERIA UR CULT: NORMAL
BILIRUB UR QL STRIP: NEGATIVE
COLOR UR: YELLOW
GLUCOSE UR QL STRIP: NEGATIVE
HGB UR QL STRIP: NEGATIVE
HYALINE CASTS #/AREA URNS LPF: NORMAL /LPF
KETONES UR QL STRIP: NEGATIVE
LEUKOCYTE ESTERASE UR QL STRIP: NEGATIVE
NITRITE UR QL STRIP: NEGATIVE
PH UR STRIP: 6 [PH] (ref 5–8)
PROT UR QL STRIP: NEGATIVE
RBC #/AREA URNS HPF: NORMAL /HPF
SERVICE CMNT-IMP: NORMAL
SP GR UR STRIP: 1.01 (ref 1–1.03)
SQUAMOUS #/AREA URNS HPF: NORMAL /HPF
WBC #/AREA URNS HPF: NORMAL /HPF

## 2024-03-01 ENCOUNTER — TELEPHONE (OUTPATIENT)
Dept: FAMILY MEDICINE | Facility: CLINIC | Age: 54
End: 2024-03-01
Payer: OTHER GOVERNMENT

## 2024-03-01 NOTE — PROGRESS NOTES
Blood counts are ok, with the exception of no eosinophils, but this isolated event is not a concern.   Repeat SHARAN remains positive. Rheumatoid factor was negative in the past, no likely not rheumatoid arthritis. SHARAN can come back positive for lots of reasons, not all of which mean and autoimmune disorder, but I am leaning more towards sending her to a rheumatologist because of the 2 positive results. Cholesterol is ok. Acceptable. TSH slightly elevated, but free thyroid hormone normal, so she does not need thyroid medication at this point. Urine normal. Electrolytes, kidneys and liver all good. Labs that reflect acute inflammation are negative.

## 2024-03-01 NOTE — TELEPHONE ENCOUNTER
----- Message from JETHRO Dominguez-CNP sent at 3/1/2024  8:26 AM CST -----  Blood counts are ok, with the exception of no eosinophils, but this isolated event is not a concern.   Repeat SHARAN remains positive. Rheumatoid factor was negative in the past, no likely not rheumatoid arthritis. SHARAN can come back positive for lots of reasons, not all of which mean and autoimmune disorder, but I am leaning more towards sending her to a rheumatologist because of the 2 positive results. Cholesterol is ok. Acceptable. TSH slightly elevated, but free thyroid hormone normal, so she does not need thyroid medication at this point. Urine normal. Electrolytes, kidneys and liver all good. Labs that reflect acute inflammation are negative.

## 2024-03-04 ENCOUNTER — TELEPHONE (OUTPATIENT)
Dept: FAMILY MEDICINE | Facility: CLINIC | Age: 54
End: 2024-03-04
Payer: OTHER GOVERNMENT

## 2024-03-04 NOTE — TELEPHONE ENCOUNTER
Spoke with patient gave lab results verbatim per Martha IGNACIO Patient verbalized understanding and sent her a copy of the plastic surgeon information via [employer]. Patient stated she has a rheumatology referral and called them in Sept but did not hear back from them for an appt. She stated they told her there was a three month wait. She will call to see if they have room now and will call us back with an update.

## 2024-03-04 NOTE — TELEPHONE ENCOUNTER
Spoke with patient regarding lab results verbatim per Martha IGNACIO Patient verbalized understanding to all. No further questions.     Provided plastic surgeon information via [employer].    Patient stated she called rheumatology provider in sept and was told there was a three month wait. She has not heard back from them yet. Patient will call them to see if they can see her soon. Patient will call back with update on this matter. If not able to be seen, she would like another provider for rheumatology.

## 2024-03-13 PROBLEM — R76.8 POSITIVE ANA (ANTINUCLEAR ANTIBODY): Status: ACTIVE | Noted: 2024-03-13

## 2024-03-13 PROBLEM — F10.90 ALCOHOL USE DISORDER: Status: ACTIVE | Noted: 2024-03-13

## 2024-03-13 PROBLEM — M13.0 INFLAMMATION OF MULTIPLE JOINTS: Status: ACTIVE | Noted: 2024-03-13

## 2024-03-13 PROBLEM — T85.44XA CAPSULAR CONTRACTURE OF BREAST IMPLANT: Status: ACTIVE | Noted: 2024-03-13

## 2024-03-13 PROBLEM — I10 HYPERTENSION: Status: ACTIVE | Noted: 2024-03-13

## 2024-03-13 NOTE — PROGRESS NOTES
"  SUBJECTIVE:    Patient ID: Joanne Hummel is a 53 y.o. female.    Chief Complaint: Follow-up (No bottles//declines flu vaccine// pt has list of things to review with [facility]// hair falling out// hot flashes//CPT code for breast removal etc-MJ)    53 year old established female here for follow up.    Continues to complain of widespread joint pain and stiffness, throughout the day, not just upon waking. Losing strength in her hands, cannot open jars, etc.   Has neck and back pain. Hair is thinning. Having lots of hot flashes. Does not have night sweats. Has had a positive SHARAN in the past, negative RF. Will check SHARAN again.     Mentions again that her breast implants need to be removed. She has a "capsular contracture." She also believes they are contributing to her neck and back pain    Continues to cope with stress by overusing alcohol. Was on a combination of medications to treat alcoholism prior to moving here and wants to resume these. She has exact doses and medication names. Will continue these as refills.   Does not think sertraline is helping her anxiety and depression. Wants to wean off.   Discussed changing to bupropion, patient agrees to this plan.    Shingles Vaccine(1 of 2) Never done  Mammogram due on 12/07/2023  Colorectal Cancer Screening due on 01/13/2025  TETANUS VACCINE due on 04/10/2028  Lipid Panel due on 02/23/2029  Hepatitis C Screening Completed     Follow-up  Associated symptoms include arthralgias, joint swelling and myalgias. Pertinent negatives include no chest pain, headaches, neck pain, vomiting or weakness.       Office Visit on 02/20/2024   Component Date Value Ref Range Status    WBC 02/23/2024 5.4  3.8 - 10.8 Thousand/uL Final    RBC 02/23/2024 4.13  3.80 - 5.10 Million/uL Final    Hemoglobin 02/23/2024 12.2  11.7 - 15.5 g/dL Final    Hematocrit 02/23/2024 35.8  35.0 - 45.0 % Final    MCV 02/23/2024 86.7  80.0 - 100.0 fL Final    MCH 02/23/2024 29.5  27.0 - 33.0 pg Final    MCHC " 02/23/2024 34.1  32.0 - 36.0 g/dL Final    RDW 02/23/2024 14.1  11.0 - 15.0 % Final    Platelets 02/23/2024 233  140 - 400 Thousand/uL Final    MPV 02/23/2024 9.5  7.5 - 12.5 fL Final    Neutrophils, Abs 02/23/2024 2,948  1,500 - 7,800 cells/uL Final    Lymph # 02/23/2024 2,030  850 - 3,900 cells/uL Final    Mono # 02/23/2024 400  200 - 950 cells/uL Final    Eos # 02/23/2024 0 (L)  15 - 500 cells/uL Final    Baso # 02/23/2024 22  0 - 200 cells/uL Final    Neutrophils Relative 02/23/2024 54.6  % Final    Lymph % 02/23/2024 37.6  % Final    Mono % 02/23/2024 7.4  % Final    Eosinophil % 02/23/2024 0.0  % Final    Basophil % 02/23/2024 0.4  % Final    SHARAN 02/23/2024 POSITIVE (A)  NEGATIVE Final    SHARAN Titer 02/23/2024 1:160 (H)  titer Final    SHARAN Pattern 02/23/2024 Nuclear, Dense Fine Speckled (A)   Final    SHARAN Titer 02/23/2024 1:80 (H)  titer Final    SHARAN Pattern 02/23/2024 Cytoplasmic (A)   Final    Rheumatoid Factor 02/23/2024 <14  <14 IU/mL Final    Glucose 02/23/2024 86  65 - 99 mg/dL Final    BUN 02/23/2024 16  7 - 25 mg/dL Final    Creatinine 02/23/2024 0.73  0.50 - 1.03 mg/dL Final    eGFR 02/23/2024 98  > OR = 60 mL/min/1.73m2 Final    BUN/Creatinine Ratio 02/23/2024 SEE NOTE:  6 - 22 (calc) Final    Sodium 02/23/2024 136  135 - 146 mmol/L Final    Potassium 02/23/2024 4.1  3.5 - 5.3 mmol/L Final    Chloride 02/23/2024 101  98 - 110 mmol/L Final    CO2 02/23/2024 24  20 - 32 mmol/L Final    Calcium 02/23/2024 10.1  8.6 - 10.4 mg/dL Final    Total Protein 02/23/2024 7.6  6.1 - 8.1 g/dL Final    Albumin 02/23/2024 4.6  3.6 - 5.1 g/dL Final    Globulin, Total 02/23/2024 3.0  1.9 - 3.7 g/dL (calc) Final    Albumin/Globulin Ratio 02/23/2024 1.5  1.0 - 2.5 (calc) Final    Total Bilirubin 02/23/2024 0.4  0.2 - 1.2 mg/dL Final    Alkaline Phosphatase 02/23/2024 69  37 - 153 U/L Final    AST 02/23/2024 24  10 - 35 U/L Final    ALT 02/23/2024 27  6 - 29 U/L Final    Cholesterol 02/23/2024 210 (H)  <200 mg/dL Final     HDL 02/23/2024 78  > OR = 50 mg/dL Final    Triglycerides 02/23/2024 81  <150 mg/dL Final    LDL Cholesterol 02/23/2024 114 (H)  mg/dL (calc) Final    HDL/Cholesterol Ratio 02/23/2024 2.7  <5.0 (calc) Final    Non HDL Chol. (LDL+VLDL) 02/23/2024 132 (H)  <130 mg/dL (calc) Final    TSH w/reflex to FT4 02/23/2024 5.87 (H)  mIU/L Final    T4, Free 02/23/2024 1.0  0.8 - 1.8 ng/dL Final    Color, UA 02/28/2024 YELLOW  YELLOW Final    Appearance, UA 02/28/2024 CLEAR  CLEAR Final    Specific Gravity, UA 02/28/2024 1.010  1.001 - 1.035 Final    pH, UA 02/28/2024 6.0  5.0 - 8.0 Final    Glucose, UA 02/28/2024 NEGATIVE  NEGATIVE Final    Bilirubin, UA 02/28/2024 NEGATIVE  NEGATIVE Final    Ketones, UA 02/28/2024 NEGATIVE  NEGATIVE Final    Occult Blood UA 02/28/2024 NEGATIVE  NEGATIVE Final    Protein, UA 02/28/2024 NEGATIVE  NEGATIVE Final    Nitrite, UA 02/28/2024 NEGATIVE  NEGATIVE Final    Leukocytes, UA 02/28/2024 NEGATIVE  NEGATIVE Final    WBC Casts, UA 02/28/2024 NONE SEEN  < OR = 5 /HPF Final    RBC Casts, UA 02/28/2024 NONE SEEN  < OR = 2 /HPF Final    Squam Epithel, UA 02/28/2024 NONE SEEN  < OR = 5 /HPF Final    Bacteria, UA 02/28/2024 NONE SEEN  NONE SEEN /HPF Final    Hyaline Casts, UA 02/28/2024 NONE SEEN  NONE SEEN /LPF Final    Service Cmt: 02/28/2024    Final    Reflexive Urine Culture 02/28/2024    Final    CRP 02/23/2024 3.5  <8.0 mg/L Final    Sed Rate 02/23/2024 28  < OR = 30 mm/h Final   Patient Outreach on 09/18/2023   Component Date Value Ref Range Status    BCS Recommendation External 12/07/2022 Repeat mammogram in 1 year   Final   Patient Outreach on 09/11/2023   Component Date Value Ref Range Status    CRC Recommendation External 01/13/2020 Repeat colonoscopy in 5 years   Final   Patient Outreach on 09/07/2023   Component Date Value Ref Range Status    PAP Recommendation External 05/15/2023 Pap in 5 years   Final    Pap 05/15/2023 Negative for intraephithelial lesion or malignancy  Negative for  intraephithelial lesion or malignancy, Other Final    HPV DNA 05/15/2023 None Detected  None Detected Final    Hepatitis C Ab 05/09/2023 Negative   Final    Lipids, Total Cholesterol 04/13/2023 162.0   Final    HDL 04/13/2023 90  mg/dL Final    LDL Cholesterol 04/13/2023 62  mg/dL Final    Triglycerides 04/13/2023 76  mg/dL Final    A1c 04/13/2023 4.9   Final       Past Medical History:   Diagnosis Date    Allergy     Anemia     Anxiety     Arthritis     Depression     Personal history of colonic polyps 01/13/2020    Colonoscopy Gila Dias MD     Social History     Socioeconomic History    Marital status:    Tobacco Use    Smoking status: Former     Current packs/day: 1.50     Average packs/day: 1.5 packs/day for 8.2 years (12.3 ttl pk-yrs)     Types: Cigarettes     Start date: 2016     Quit date: 1983    Smokeless tobacco: Never   Substance and Sexual Activity    Alcohol use: Yes     Comment: social    Drug use: Never    Sexual activity: Yes     Partners: Male     Social Determinants of Health     Financial Resource Strain: Low Risk  (2/14/2024)    Overall Financial Resource Strain (CARDIA)     Difficulty of Paying Living Expenses: Not hard at all   Food Insecurity: No Food Insecurity (2/14/2024)    Hunger Vital Sign     Worried About Running Out of Food in the Last Year: Never true     Ran Out of Food in the Last Year: Never true   Transportation Needs: No Transportation Needs (2/14/2024)    PRAPARE - Transportation     Lack of Transportation (Medical): No     Lack of Transportation (Non-Medical): No   Physical Activity: Inactive (2/14/2024)    Exercise Vital Sign     Days of Exercise per Week: 0 days     Minutes of Exercise per Session: 0 min   Stress: Stress Concern Present (2/14/2024)    [place] citizen [employer] - Occupational Stress Questionnaire     Feeling of Stress : Very much   Social Connections: Unknown (2/14/2024)    Social Connection and Isolation Panel [NHANES]      Frequency of Communication with Friends and Family: Three times a week     Frequency of Social Gatherings with Friends and Family: Never     Active Member of Clubs or Organizations: No     Attends Club or Organization Meetings: Never     Marital Status:    Housing Stability: Low Risk  (2/14/2024)    Housing Stability Vital Sign     Unable to Pay for Housing in the Last Year: No     Number of Places Lived in the Last Year: 2     Unstable Housing in the Last Year: No     Past Surgical History:   Procedure Laterality Date    AUGMENTATION OF BREAST Bilateral     colonscopy      EYE SURGERY      TONSILLECTOMY       Family History   Problem Relation Age of Onset    Arthritis Mother     Skin cancer Mother     Alcohol abuse Father     COPD Maternal Grandfather     Alcohol abuse Paternal Grandfather        Review of patient's allergies indicates:   Allergen Reactions    Epinephrine Palpitations       Current Outpatient Medications:     acamprosate (CAMPRAL) 333 mg tablet, Take 1 tablet (333 mg total) by mouth 3 (three) times daily., Disp: 90 tablet, Rfl: 5    buPROPion (WELLBUTRIN XL) 150 MG TB24 tablet, Take 1 tablet (150 mg total) by mouth once daily., Disp: 30 tablet, Rfl: 11    calcipotriene (DOVONOX) 0.005 % ointment, , Disp: , Rfl:     calcium-vitamin D 600 mg-10 mcg (400 unit) Tab, Take by mouth., Disp: , Rfl:     clobetasol 0.05% (TEMOVATE) 0.05 % Oint, Apply topically 2 (two) times daily., Disp: , Rfl:     COLLAGEN-BIOTIN-ASCORBIC ACID ORAL, Take 1 capsule by mouth once daily., Disp: , Rfl:     DHA-EPA-LIPOTROPIC AGENT-VIT E ORAL, , Disp: , Rfl:     diazePAM (VALIUM) 10 MG Tab, Day 1: 10mg every 6 hours Day 2: 10mg every 8 hours Day 3: 10mg every 12 hours Days 4 and 5: 10mg at bedtime, Disp: 11 tablet, Rfl: 0    folic acid (FOLVITE) 1 MG tablet, Take 1 tablet (1 mg total) by mouth once daily., Disp: 90 tablet, Rfl: 1    hydrOXYzine HCL (ATARAX) 10 MG Tab, Take 1 tablet (10 mg total) by mouth 3 (three) times  "daily as needed for Anxiety., Disp: 90 tablet, Rfl: 11    meloxicam (MOBIC) 7.5 MG tablet, Take 1 tablet (7.5 mg total) by mouth once daily., Disp: 30 tablet, Rfl: 1    metoprolol succinate (TOPROL XL) 25 MG 24 hr tablet, Take 1 tablet (25 mg total) by mouth once daily., Disp: 90 tablet, Rfl: 1    naltrexone (DEPADE) 50 mg tablet, Take 50 mg by mouth once daily., Disp: 30 tablet, Rfl: 5    omeprazole (PRILOSEC) 20 MG capsule, Take 1 capsule (20 mg total) by mouth once daily., Disp: 90 capsule, Rfl: 1    sertraline (ZOLOFT) 100 MG tablet, Take 2 tablets (200 mg total) by mouth once daily., Disp: 180 tablet, Rfl: 1    tretinoin (RETIN-A) 0.05 % cream, , Disp: , Rfl:     Review of Systems   Constitutional:  Negative for activity change and unexpected weight change.   HENT:  Positive for rhinorrhea and trouble swallowing. Negative for hearing loss.    Eyes:  Negative for discharge and visual disturbance.   Respiratory:  Negative for chest tightness and wheezing.    Cardiovascular:  Positive for palpitations. Negative for chest pain.   Gastrointestinal:  Positive for constipation and diarrhea. Negative for blood in stool and vomiting.   Endocrine: Negative for polydipsia and polyuria.   Genitourinary:  Negative for difficulty urinating, dysuria, hematuria and menstrual problem.   Musculoskeletal:  Positive for arthralgias, joint swelling and myalgias. Negative for neck pain.   Neurological:  Negative for weakness and headaches.   Psychiatric/Behavioral:  Positive for dysphoric mood. Negative for confusion.            Objective:      Vitals:    02/20/24 1500   BP: 118/72   Pulse: 80   Weight: 78.9 kg (174 lb)   Height: 5' 7.5" (1.715 m)     Physical Exam  Vitals and nursing note reviewed.   Constitutional:       General: She is not in acute distress.     Appearance: Normal appearance. She is well-developed and normal weight. She is not ill-appearing.   HENT:      Head: Normocephalic and atraumatic.      Nose: Nose normal. "      Mouth/Throat:      Mouth: Mucous membranes are moist.      Pharynx: Oropharynx is clear.   Eyes:      General: No scleral icterus.     Pupils: Pupils are equal, round, and reactive to light.   Neck:      Thyroid: No thyromegaly.      Vascular: No carotid bruit.   Cardiovascular:      Rate and Rhythm: Normal rate and regular rhythm.      Pulses: Normal pulses.      Heart sounds: Normal heart sounds. No murmur heard.  Pulmonary:      Effort: Pulmonary effort is normal.      Breath sounds: Normal breath sounds. No wheezing or rales.   Abdominal:      Palpations: Abdomen is soft.      Tenderness: There is no abdominal tenderness.   Musculoskeletal:         General: Normal range of motion.      Lumbar back: Normal. No spasms.      Right lower leg: No edema.      Left lower leg: No edema.   Skin:     General: Skin is warm and dry.      Capillary Refill: Capillary refill takes less than 2 seconds.      Coloration: Skin is not jaundiced or pale.   Neurological:      General: No focal deficit present.      Mental Status: She is alert and oriented to person, place, and time. Mental status is at baseline.      Cranial Nerves: No cranial nerve deficit.      Sensory: No sensory deficit.      Motor: No weakness.      Gait: Gait normal.   Psychiatric:         Mood and Affect: Mood normal.         Behavior: Behavior normal. Behavior is cooperative.         Thought Content: Thought content normal.         Judgment: Judgment normal.         Assessment:       1. Hypertension, unspecified type    2. Capsular contracture of breast implant, sequela    3. Neck pain    4. Upper back pain    5. Positive SHARAN (antinuclear antibody)    6. Alcohol use disorder    7. Anxiety    8. Moderate episode of recurrent major depressive disorder    9. Multiple joint pain    10. Inflammation of multiple joints    11. Screening cholesterol level         Plan:       Hypertension, unspecified type  Well controlled. Continue as is. Labs for monitoring.  -      CBC Auto Differential; Future; Expected date: 02/20/2024  -     Comprehensive Metabolic Panel; Future; Expected date: 02/20/2024  -     TSH w/reflex to FT4; Future; Expected date: 02/20/2024  -     Urinalysis, Reflex to Urine Culture Urine, Clean Catch; Future; Expected date: 02/20/2024    Capsular contracture of breast implant, sequela  Referral to breast specialist  -     Ambulatory referral/consult to General Surgery; Future; Expected date: 02/27/2024    Neck pain  Referral to breast specialist to see about reduction surgery  -     Ambulatory referral/consult to General Surgery; Future; Expected date: 02/27/2024    Upper back pain  Referral to breast specialist to see about reduction surgery  -     Ambulatory referral/consult to General Surgery; Future; Expected date: 02/27/2024    Positive SHARAN (antinuclear antibody)  Repeat SHARAN, RF, CRP  -     SHARAN Screen w/Reflex; Future; Expected date: 02/20/2024  -     Rheumatoid Factor; Future; Expected date: 02/20/2024  -     C-REACTIVE PROTEIN; Future; Expected date: 02/20/2024    Alcohol use disorder  Labs for monitoring  -     CBC Auto Differential; Future; Expected date: 02/20/2024  -     Comprehensive Metabolic Panel; Future; Expected date: 02/20/2024  -     TSH w/reflex to FT4; Future; Expected date: 02/20/2024    Anxiety  -     TSH w/reflex to FT4; Future; Expected date: 02/20/2024    Moderate episode of recurrent major depressive disorder  Start bupropion. Discussed and educated on how to wean off SSRI  -     buPROPion (WELLBUTRIN XL) 150 MG TB24 tablet; Take 1 tablet (150 mg total) by mouth once daily.  Dispense: 30 tablet; Refill: 11    Multiple joint pain  -     SHARAN Screen w/Reflex; Future; Expected date: 02/20/2024  -     Rheumatoid Factor; Future; Expected date: 02/20/2024  -     Sedimentation rate; Future; Expected date: 02/20/2024  -     Comprehensive Metabolic Panel; Future; Expected date: 02/20/2024  -     C-REACTIVE PROTEIN; Future; Expected date:  02/20/2024    Inflammation of multiple joints  -     CBC Auto Differential; Future; Expected date: 02/20/2024  -     SHARAN Screen w/Reflex; Future; Expected date: 02/20/2024  -     Rheumatoid Factor; Future; Expected date: 02/20/2024  -     Sedimentation rate; Future; Expected date: 02/20/2024  -     Comprehensive Metabolic Panel; Future; Expected date: 02/20/2024  -     C-REACTIVE PROTEIN; Future; Expected date: 02/20/2024    Screening cholesterol level  -     Lipid Panel; Future; Expected date: 02/20/2024    Other orders  -     Sedimentation Rate      Follow up in about 6 months (around 8/20/2024).        3/13/2024 Alyx English

## 2024-04-11 ENCOUNTER — PATIENT MESSAGE (OUTPATIENT)
Dept: FAMILY MEDICINE | Facility: CLINIC | Age: 54
End: 2024-04-11
Payer: OTHER GOVERNMENT

## 2024-04-11 DIAGNOSIS — F10.10 ALCOHOL ABUSE: Primary | ICD-10-CM

## 2024-04-11 DIAGNOSIS — K21.9 GASTROESOPHAGEAL REFLUX DISEASE, UNSPECIFIED WHETHER ESOPHAGITIS PRESENT: ICD-10-CM

## 2024-04-11 DIAGNOSIS — I10 HYPERTENSION, UNSPECIFIED TYPE: ICD-10-CM

## 2024-04-11 DIAGNOSIS — F41.9 ANXIETY: ICD-10-CM

## 2024-04-11 DIAGNOSIS — M25.522 LEFT ELBOW PAIN: ICD-10-CM

## 2024-04-11 RX ORDER — METOPROLOL SUCCINATE 25 MG/1
25 TABLET, EXTENDED RELEASE ORAL DAILY
Qty: 90 TABLET | Refills: 1 | Status: SHIPPED | OUTPATIENT
Start: 2024-04-11

## 2024-04-11 RX ORDER — MELOXICAM 7.5 MG/1
7.5 TABLET ORAL DAILY
Qty: 90 TABLET | Refills: 1 | Status: SHIPPED | OUTPATIENT
Start: 2024-04-11

## 2024-04-11 RX ORDER — OMEPRAZOLE 20 MG/1
20 CAPSULE, DELAYED RELEASE ORAL DAILY
Qty: 90 CAPSULE | Refills: 1 | Status: SHIPPED | OUTPATIENT
Start: 2024-04-11

## 2024-04-11 RX ORDER — FOLIC ACID 1 MG/1
1 TABLET ORAL DAILY
Qty: 90 TABLET | Refills: 1 | Status: SHIPPED | OUTPATIENT
Start: 2024-04-11

## 2024-04-11 RX ORDER — HYDROXYZINE HYDROCHLORIDE 10 MG/1
10 TABLET, FILM COATED ORAL 3 TIMES DAILY PRN
Qty: 90 TABLET | Refills: 11 | Status: CANCELLED | OUTPATIENT
Start: 2024-04-11

## 2024-04-12 NOTE — TELEPHONE ENCOUNTER
Last office visit on 2/20/24 and upcoming on 8/20/24. Upon review of chart prescription prescribed on 9/18/23 with 11 refills.

## 2024-04-16 ENCOUNTER — PATIENT MESSAGE (OUTPATIENT)
Dept: FAMILY MEDICINE | Facility: CLINIC | Age: 54
End: 2024-04-16
Payer: OTHER GOVERNMENT

## 2024-05-03 ENCOUNTER — PATIENT MESSAGE (OUTPATIENT)
Dept: FAMILY MEDICINE | Facility: CLINIC | Age: 54
End: 2024-05-03
Payer: OTHER GOVERNMENT

## 2024-05-03 RX ORDER — SERTRALINE HYDROCHLORIDE 100 MG/1
200 TABLET, FILM COATED ORAL DAILY
Qty: 180 TABLET | Refills: 1 | Status: SHIPPED | OUTPATIENT
Start: 2024-05-03 | End: 2024-05-06 | Stop reason: SDUPTHER

## 2024-05-03 NOTE — TELEPHONE ENCOUNTER
Pt is needing a refill on her sertraline. Last office visit 02/20/2024. Next office visit 08/20/2024

## 2024-05-06 RX ORDER — SERTRALINE HYDROCHLORIDE 100 MG/1
200 TABLET, FILM COATED ORAL DAILY
Qty: 180 TABLET | Refills: 1 | Status: SHIPPED | OUTPATIENT
Start: 2024-05-06

## 2024-06-26 ENCOUNTER — HOSPITAL ENCOUNTER (OUTPATIENT)
Dept: RADIOLOGY | Facility: HOSPITAL | Age: 54
Discharge: HOME | End: 2024-06-26
Attending: FAMILY MEDICINE
Payer: OTHER GOVERNMENT

## 2024-06-26 VITALS — BODY MASS INDEX: 26.85 KG/M2 | HEIGHT: 68 IN

## 2024-06-26 DIAGNOSIS — Z12.31 OTHER SCREENING MAMMOGRAM: ICD-10-CM

## 2024-06-26 PROCEDURE — 77067 SCR MAMMO BI INCL CAD: CPT | Mod: 26,,, | Performed by: RADIOLOGY

## 2024-06-26 PROCEDURE — 77063 BREAST TOMOSYNTHESIS BI: CPT | Mod: 26,,, | Performed by: RADIOLOGY

## 2024-06-26 PROCEDURE — 77067 SCR MAMMO BI INCL CAD: CPT | Mod: TC,PO

## 2024-06-27 ENCOUNTER — TELEPHONE (OUTPATIENT)
Dept: FAMILY MEDICINE | Facility: CLINIC | Age: 54
End: 2024-06-27
Payer: OTHER GOVERNMENT

## 2024-06-27 NOTE — TELEPHONE ENCOUNTER
----- Message from LYNDA Dominguez sent at 6/27/2024  1:04 PM CDT -----  Please let patient know her mammogram is normal and we can repeat annually.

## 2024-08-13 ENCOUNTER — PATIENT MESSAGE (OUTPATIENT)
Dept: FAMILY MEDICINE | Facility: CLINIC | Age: 54
End: 2024-08-13
Payer: OTHER GOVERNMENT

## 2024-08-20 ENCOUNTER — OFFICE VISIT (OUTPATIENT)
Dept: FAMILY MEDICINE | Facility: CLINIC | Age: 54
End: 2024-08-20
Payer: OTHER GOVERNMENT

## 2024-08-20 ENCOUNTER — HOSPITAL ENCOUNTER (OUTPATIENT)
Dept: RADIOLOGY | Facility: HOSPITAL | Age: 54
Discharge: HOME | End: 2024-08-20
Payer: OTHER GOVERNMENT

## 2024-08-20 VITALS
SYSTOLIC BLOOD PRESSURE: 136 MMHG | BODY MASS INDEX: 26.4 KG/M2 | DIASTOLIC BLOOD PRESSURE: 82 MMHG | OXYGEN SATURATION: 97 % | HEART RATE: 65 BPM | WEIGHT: 174.19 LBS | HEIGHT: 68 IN

## 2024-08-20 DIAGNOSIS — Z78.0 MENOPAUSE: ICD-10-CM

## 2024-08-20 DIAGNOSIS — F41.9 ANXIETY: ICD-10-CM

## 2024-08-20 DIAGNOSIS — I10 HYPERTENSION, UNSPECIFIED TYPE: Primary | ICD-10-CM

## 2024-08-20 DIAGNOSIS — N95.1 MENOPAUSAL SYMPTOMS: ICD-10-CM

## 2024-08-20 DIAGNOSIS — F32.9 MAJOR DEPRESSIVE DISORDER WITH CURRENT ACTIVE EPISODE, UNSPECIFIED DEPRESSION EPISODE SEVERITY, UNSPECIFIED WHETHER RECURRENT: ICD-10-CM

## 2024-08-20 DIAGNOSIS — T85.44XS CAPSULAR CONTRACTURE OF BREAST IMPLANT, SEQUELA: ICD-10-CM

## 2024-08-20 DIAGNOSIS — M53.82 IMPAIRED RANGE OF MOTION OF CERVICAL SPINE: ICD-10-CM

## 2024-08-20 DIAGNOSIS — M54.6 THORACIC BACK PAIN, UNSPECIFIED BACK PAIN LATERALITY, UNSPECIFIED CHRONICITY: ICD-10-CM

## 2024-08-20 DIAGNOSIS — F10.90 ALCOHOL USE DISORDER: ICD-10-CM

## 2024-08-20 PROCEDURE — 72070 X-RAY EXAM THORAC SPINE 2VWS: CPT | Mod: 26,,, | Performed by: RADIOLOGY

## 2024-08-20 PROCEDURE — 99214 OFFICE O/P EST MOD 30 MIN: CPT | Mod: S$GLB,,,

## 2024-08-20 PROCEDURE — 77080 DXA BONE DENSITY AXIAL: CPT | Mod: TC,PO

## 2024-08-20 PROCEDURE — 72070 X-RAY EXAM THORAC SPINE 2VWS: CPT | Mod: TC,PO

## 2024-08-20 PROCEDURE — 77080 DXA BONE DENSITY AXIAL: CPT | Mod: 26,,, | Performed by: RADIOLOGY

## 2024-08-20 RX ORDER — BUPROPION HYDROCHLORIDE 75 MG/1
75 TABLET ORAL 2 TIMES DAILY
Qty: 60 TABLET | Refills: 1 | Status: SHIPPED | OUTPATIENT
Start: 2024-08-20 | End: 2025-08-20

## 2024-08-20 NOTE — PROGRESS NOTES
"  SUBJECTIVE:    Patient ID: Joanne Hummel is a 53 y.o. female.    Chief Complaint: Follow-up (No bottles//Pt is here for a 6 month follow up//KE)    53 year old established female presents today for routine follow up.    She would like to see Dr Harris for "skin mapping."    She is still consuming excess alcohol. She does want to start counseling.    She has seen several breast surgeons and all have told her she needs to see a plastic surgeon about having her implants removed.    She still wants to see rheumatology. I advised that referral is still good and she needs to call to schedule.     She wants "hormones checked."    Still having vasomotor menopausal symptoms.    Also reports today she is having some upper back/neck pain. Not new but happening more often. Has a h/o cervical DDD.     She wants to wean off bupropion. Will continue sertraline.    Shingles Vaccine(1 of 2) Never done  Colorectal Cancer Screening due on 01/13/2025  Mammogram due on 06/26/2025  TETANUS VACCINE due on 04/10/2028  Lipid Panel due on 02/23/2029  Hepatitis C Screening Completed             Follow-up  Associated symptoms include arthralgias and neck pain. Pertinent negatives include no chest pain, headaches, joint swelling, vomiting or weakness.     Questions: Patient Score Max Score   What is the year? Season? Date? Day? Month? 5 5   Where are we now? State? County? Town/City? Hospital? Floor? 5 5   Name 3 unrelated objects, then ask patient to repeat them. Response is used for scoring. Repeat until patient learns them if possible.  5 3   Serial 7s (93, 86, 79, 72, 65) OR spell WORLD backwards 5 5   Recall of previous 3 objects 3 3   Naming of 2 simple objects 2 2   Repeat the phrase: 'No ifs, ands, or buts. 1 1   Take the paper in your right hand, fold it in half, and put it on the floor. 3 3   Please read this and do what it says. (Written instruction to close eyes).  1 1   Make up and write a sentence about anything. " (Must contain noun and verb) 1 1   Please copy this picture. (Must contain all 10 angles with 2 intersecting) 1 1   TOTAL: 30 30   Folstein et al., 1975  [email or web address]    Office Visit on 02/20/2024   Component Date Value Ref Range Status    WBC 02/23/2024 5.4  3.8 - 10.8 Thousand/uL Final    RBC 02/23/2024 4.13  3.80 - 5.10 Million/uL Final    Hemoglobin 02/23/2024 12.2  11.7 - 15.5 g/dL Final    Hematocrit 02/23/2024 35.8  35.0 - 45.0 % Final    MCV 02/23/2024 86.7  80.0 - 100.0 fL Final    MCH 02/23/2024 29.5  27.0 - 33.0 pg Final    MCHC 02/23/2024 34.1  32.0 - 36.0 g/dL Final    RDW 02/23/2024 14.1  11.0 - 15.0 % Final    Platelets 02/23/2024 233  140 - 400 Thousand/uL Final    MPV 02/23/2024 9.5  7.5 - 12.5 fL Final    Neutrophils, Abs 02/23/2024 2,948  1,500 - 7,800 cells/uL Final    Lymph # 02/23/2024 2,030  850 - 3,900 cells/uL Final    Mono # 02/23/2024 400  200 - 950 cells/uL Final    Eos # 02/23/2024 0 (L)  15 - 500 cells/uL Final    Baso # 02/23/2024 22  0 - 200 cells/uL Final    Neutrophils Relative 02/23/2024 54.6  % Final    Lymph % 02/23/2024 37.6  % Final    Mono % 02/23/2024 7.4  % Final    Eosinophil % 02/23/2024 0.0  % Final    Basophil % 02/23/2024 0.4  % Final    SHARAN 02/23/2024 POSITIVE (A)  NEGATIVE Final    SHARAN Titer 02/23/2024 1:160 (H)  titer Final    SHARAN Pattern 02/23/2024 Nuclear, Dense Fine Speckled (A)   Final    SHARAN Titer 02/23/2024 1:80 (H)  titer Final    SHARAN Pattern 02/23/2024 Cytoplasmic (A)   Final    Rheumatoid Factor 02/23/2024 <14  <14 IU/mL Final    Glucose 02/23/2024 86  65 - 99 mg/dL Final    BUN 02/23/2024 16  7 - 25 mg/dL Final    Creatinine 02/23/2024 0.73  0.50 - 1.03 mg/dL Final    eGFR 02/23/2024 98  > OR = 60 mL/min/1.73m2 Final    BUN/Creatinine Ratio 02/23/2024 SEE NOTE:  6 - 22 (calc) Final    Sodium 02/23/2024 136  135 - 146 mmol/L Final    Potassium 02/23/2024 4.1  3.5 - 5.3 mmol/L Final    Chloride 02/23/2024  101  98 - 110 mmol/L Final    CO2 02/23/2024 24  20 - 32 mmol/L Final    Calcium 02/23/2024 10.1  8.6 - 10.4 mg/dL Final    Total Protein 02/23/2024 7.6  6.1 - 8.1 g/dL Final    Albumin 02/23/2024 4.6  3.6 - 5.1 g/dL Final    Globulin, Total 02/23/2024 3.0  1.9 - 3.7 g/dL (calc) Final    Albumin/Globulin Ratio 02/23/2024 1.5  1.0 - 2.5 (calc) Final    Total Bilirubin 02/23/2024 0.4  0.2 - 1.2 mg/dL Final    Alkaline Phosphatase 02/23/2024 69  37 - 153 U/L Final    AST 02/23/2024 24  10 - 35 U/L Final    ALT 02/23/2024 27  6 - 29 U/L Final    Cholesterol 02/23/2024 210 (H)  <200 mg/dL Final    HDL 02/23/2024 78  > OR = 50 mg/dL Final    Triglycerides 02/23/2024 81  <150 mg/dL Final    LDL Cholesterol 02/23/2024 114 (H)  mg/dL (calc) Final    HDL/Cholesterol Ratio 02/23/2024 2.7  <5.0 (calc) Final    Non HDL Chol. (LDL+VLDL) 02/23/2024 132 (H)  <130 mg/dL (calc) Final    TSH w/reflex to FT4 02/23/2024 5.87 (H)  mIU/L Final    T4, Free 02/23/2024 1.0  0.8 - 1.8 ng/dL Final    Color, UA 02/28/2024 YELLOW  YELLOW Final    Appearance, UA 02/28/2024 CLEAR  CLEAR Final    Specific Gravity, UA 02/28/2024 1.010  1.001 - 1.035 Final    pH, UA 02/28/2024 6.0  5.0 - 8.0 Final    Glucose, UA 02/28/2024 NEGATIVE  NEGATIVE Final    Bilirubin, UA 02/28/2024 NEGATIVE  NEGATIVE Final    Ketones, UA 02/28/2024 NEGATIVE  NEGATIVE Final    Occult Blood UA 02/28/2024 NEGATIVE  NEGATIVE Final    Protein, UA 02/28/2024 NEGATIVE  NEGATIVE Final    Nitrite, UA 02/28/2024 NEGATIVE  NEGATIVE Final    Leukocytes, UA 02/28/2024 NEGATIVE  NEGATIVE Final    WBC Casts, UA 02/28/2024 NONE SEEN  < OR = 5 /HPF Final    RBC Casts, UA 02/28/2024 NONE SEEN  < OR = 2 /HPF Final    Squam Epithel, UA 02/28/2024 NONE SEEN  < OR = 5 /HPF Final    Bacteria, UA 02/28/2024 NONE SEEN  NONE SEEN /HPF Final    Hyaline Casts, UA 02/28/2024 NONE SEEN  NONE SEEN /LPF Final    Service Cmt: 02/28/2024    Final    Reflexive Urine Culture 02/28/2024    Final    CRP  02/23/2024 3.5  <8.0 mg/L Final    Sed Rate 02/23/2024 28  < OR = 30 mm/h Final       Past Medical History:   Diagnosis Date    Allergy     Anemia     Anxiety     Arthritis     Depression     Personal history of colonic polyps 01/13/2020    Colonoscopy Gila Dias MD     Social History     Socioeconomic History    Marital status:    Tobacco Use    Smoking status: Former     Current packs/day: 1.50     Average packs/day: 1.5 packs/day for 8.7 years (13.0 ttl pk-yrs)     Types: Cigarettes     Start date: 2016     Quit date: 1983    Smokeless tobacco: Never   Substance and Sexual Activity    Alcohol use: Yes     Comment: social    Drug use: Never    Sexual activity: Yes     Partners: Male     Social Determinants of Health     Financial Resource Strain: Low Risk  (2/14/2024)    Overall Financial Resource Strain (CARDIA)     Difficulty of Paying Living Expenses: Not hard at all   Food Insecurity: No Food Insecurity (2/14/2024)    Hunger Vital Sign     Worried About Running Out of Food in the Last Year: Never true     Ran Out of Food in the Last Year: Never true   Transportation Needs: No Transportation Needs (2/14/2024)    PRAPARE - Transportation     Lack of Transportation (Medical): No     Lack of Transportation (Non-Medical): No   Physical Activity: Inactive (2/14/2024)    Exercise Vital Sign     Days of Exercise per Week: 0 days     Minutes of Exercise per Session: 0 min   Stress: Stress Concern Present (2/14/2024)    [employer] - Occupational Stress Questionnaire     Feeling of Stress : Very much   Housing Stability: Low Risk  (2/14/2024)    Housing Stability Vital Sign     Unable to Pay for Housing in the Last Year: No     Number of Places Lived in the Last Year: 2     Unstable Housing in the Last Year: No     Past Surgical History:   Procedure Laterality Date    AUGMENTATION OF BREAST Bilateral     colonscopy      EYE SURGERY      TONSILLECTOMY       Family History    Problem Relation Name Age of Onset    Arthritis Mother      Skin cancer Mother      Alcohol abuse Father      COPD Maternal Grandfather      Alcohol abuse Paternal Grandfather         The 10-year CVD risk score (DENISE'Jhonnyino, et al., 2008) is: 7%    Values used to calculate the score:      Age: 53 years      Sex: Female      Diabetic: No      Tobacco smoker: No      Systolic Blood Pressure: 136 mmHg      Is BP treated: Yes      HDL Cholesterol: 78 mg/dL      Total Cholesterol: 210 mg/dL    All of your core healthy metrics are met.      Review of patient's allergies indicates:   Allergen Reactions    Epinephrine Palpitations       Current Outpatient Medications:     buPROPion (WELLBUTRIN) 75 MG tablet, Take 1 tablet (75 mg total) by mouth 2 (two) times daily., Disp: 60 tablet, Rfl: 1    calcipotriene (DOVONOX) 0.005 % ointment, , Disp: , Rfl:     calcium-vitamin D 600 mg-10 mcg (400 unit) Tab, Take by mouth., Disp: , Rfl:     clobetasol 0.05% (TEMOVATE) 0.05 % Oint, Apply topically 2 (two) times daily., Disp: , Rfl:     COLLAGEN-BIOTIN-ASCORBIC ACID ORAL, Take 1 capsule by mouth once daily., Disp: , Rfl:     DHA-EPA-LIPOTROPIC AGENT-VIT E ORAL, , Disp: , Rfl:     fezolinetant 45 mg Tab, Take 45 mg by mouth once daily., Disp: 30 tablet, Rfl: 11    folic acid (FOLVITE) 1 MG tablet, Take 1 tablet (1 mg total) by mouth once daily., Disp: 90 tablet, Rfl: 1    hydrOXYzine HCL (ATARAX) 10 MG Tab, Take 1 tablet (10 mg total) by mouth 3 (three) times daily as needed for Anxiety., Disp: 90 tablet, Rfl: 11    meloxicam (MOBIC) 7.5 MG tablet, Take 1 tablet (7.5 mg total) by mouth once daily., Disp: 90 tablet, Rfl: 1    metoprolol succinate (TOPROL XL) 25 MG 24 hr tablet, Take 1 tablet (25 mg total) by mouth once daily., Disp: 90 tablet, Rfl: 1    omeprazole (PRILOSEC) 20 MG capsule, Take 1 capsule (20 mg total) by mouth once daily., Disp: 90 capsule, Rfl: 1    sertraline (ZOLOFT) 100 MG tablet, Take 2 tablets (200 mg total) by  "mouth once daily., Disp: 180 tablet, Rfl: 1    tretinoin (RETIN-A) 0.05 % cream, , Disp: , Rfl:     Review of Systems   Constitutional:  Negative for activity change and unexpected weight change.   HENT:  Positive for rhinorrhea. Negative for hearing loss and trouble swallowing.    Eyes:  Positive for visual disturbance. Negative for discharge.   Respiratory:  Negative for chest tightness and wheezing.    Cardiovascular:  Negative for chest pain and palpitations.   Gastrointestinal:  Positive for constipation and diarrhea. Negative for blood in stool and vomiting.   Endocrine: Negative for polydipsia and polyuria.   Genitourinary:  Negative for difficulty urinating, dysuria, hematuria and menstrual problem.   Musculoskeletal:  Positive for arthralgias and neck pain. Negative for joint swelling.   Neurological:  Negative for weakness and headaches.   Psychiatric/Behavioral:  Positive for dysphoric mood. Negative for confusion.            Objective:      Vitals:    08/20/24 1427   BP: 136/82   Pulse: 65   SpO2: 97%   Weight: 79 kg (174 lb 3.2 oz)   Height: 5' 7.5" (1.715 m)     Physical Exam  Vitals and nursing note reviewed.   Constitutional:       General: She is not in acute distress.     Appearance: Normal appearance. She is well-developed and normal weight. She is not ill-appearing.   HENT:      Head: Normocephalic and atraumatic.      Nose: Nose normal.      Mouth/Throat:      Mouth: Mucous membranes are moist.      Pharynx: Oropharynx is clear.   Eyes:      General: No scleral icterus.     Pupils: Pupils are equal, round, and reactive to light.   Neck:      Thyroid: No thyromegaly.      Vascular: No carotid bruit.   Cardiovascular:      Rate and Rhythm: Normal rate and regular rhythm.      Pulses: Normal pulses.      Heart sounds: Normal heart sounds. No murmur heard.  Pulmonary:      Effort: Pulmonary effort is normal.      Breath sounds: Normal breath sounds. No wheezing or rales.   Abdominal:      " Palpations: Abdomen is soft.      Tenderness: There is no abdominal tenderness.   Musculoskeletal:         General: Normal range of motion.      Lumbar back: Normal. No spasms.      Right lower leg: No edema.      Left lower leg: No edema.   Skin:     General: Skin is warm and dry.      Capillary Refill: Capillary refill takes less than 2 seconds.      Coloration: Skin is not jaundiced or pale.   Neurological:      General: No focal deficit present.      Mental Status: She is alert and oriented to person, place, and time. Mental status is at baseline.      Cranial Nerves: No cranial nerve deficit.      Sensory: No sensory deficit.      Motor: No weakness.      Gait: Gait normal.   Psychiatric:         Mood and Affect: Mood normal.         Behavior: Behavior normal. Behavior is cooperative.         Thought Content: Thought content normal.         Judgment: Judgment normal.           Assessment:       1. Hypertension, unspecified type    2. Impaired range of motion of cervical spine    3. Thoracic back pain, unspecified back pain laterality, unspecified chronicity    4. Capsular contracture of breast implant, sequela    5. Menopause    6. Menopausal symptoms    7. Major depressive disorder with current active episode, unspecified depression episode severity, unspecified whether recurrent    8. Anxiety    9. Alcohol use disorder         Plan:       Hypertension, unspecified type  Blood pressure goals discussed with patient.  Tolerating current medications.  BP controlled today.  Continue current management.      Impaired range of motion of cervical spine  Patient feels like her breasts are a cause of her back and neck pain  Can continue meloxicam for pain and inflammation  -     Ambulatory referral/consult to Breast Surgery; Future; Expected date: 08/27/2024    Thoracic back pain, unspecified back pain laterality, unspecified chronicity  Known DDD of cervical spine. Having pain farther down her back now. Xray to  evaluate Tspine.  -     X-Ray Thoracic Spine 4 or more views; Future; Expected date: 08/20/2024    Capsular contracture of breast implant, sequela  Referral to breast specialist at patient's request  -     Ambulatory referral/consult to Breast Surgery; Future; Expected date: 08/27/2024    Menopause  Patient of appropriate population group due for screening test.   -     DXA Bone Density Axial Skeleton 1 or more sites; Future; Expected date: 08/20/2024    Menopausal symptoms  Labs for evaluation of health/monitoring of condition.   -     LUTEINIZING HORMONE; Future; Expected date: 08/20/2024  -     FOLLICLE STIMULATING HORMONE; Future; Expected date: 08/20/2024  -     ESTRADIOL; Future; Expected date: 08/20/2024    Major depressive disorder with current active episode, unspecified depression episode severity, unspecified whether recurrent  Wants to wean down on bupropion dose. Discussed how to do this today.  -     buPROPion (WELLBUTRIN) 75 MG tablet; Take 1 tablet (75 mg total) by mouth 2 (two) times daily.  Dispense: 60 tablet; Refill: 1    Anxiety  Anxiety Counseling  Regarding anxiety, we discussed signs and symptoms of anxiety with patient including: tachycardia, dysrhythmias, tachypnea, diaphoresis, trembling, dizziness, diarrhea, dry mouth, and difficulty swallowing.    Patient was encouraged to eat a well-balanced, healthy diet; get plenty of rest; exercise daily; limit caffeine and alcohol intake; participate in meditation; talk with family and/or friends about things that may be considered stressful and/or seek professional counseling. Patient was instructed to take medications as prescribed.    Alcohol use disorder   Patient has stopped taking Campral and naltrexone. Does want to try to cut back on alcohol use. Is actively seeking counseling.  Has list of resources.      Follow up in about 6 months (around 2/20/2025).        9/7/2024 Alyx English

## 2024-08-21 ENCOUNTER — TELEPHONE (OUTPATIENT)
Dept: FAMILY MEDICINE | Facility: CLINIC | Age: 54
End: 2024-08-21
Payer: OTHER GOVERNMENT

## 2024-08-21 NOTE — TELEPHONE ENCOUNTER
----- Message from LYNDA Dominguez sent at 8/21/2024  3:05 PM CDT -----  Let patient know xray dose confirm scoliosis in mid back, and some degenerative disc disease. NO osteoporosis.

## 2024-08-22 ENCOUNTER — LAB VISIT (OUTPATIENT)
Dept: LAB | Facility: HOSPITAL | Age: 54
End: 2024-08-22
Payer: OTHER GOVERNMENT

## 2024-08-22 DIAGNOSIS — N95.1 MENOPAUSAL SYMPTOMS: ICD-10-CM

## 2024-08-22 PROCEDURE — 36415 COLL VENOUS BLD VENIPUNCTURE: CPT

## 2024-08-22 PROCEDURE — 83001 ASSAY OF GONADOTROPIN (FSH): CPT

## 2024-08-22 PROCEDURE — 83002 ASSAY OF GONADOTROPIN (LH): CPT

## 2024-08-22 PROCEDURE — 82670 ASSAY OF TOTAL ESTRADIOL: CPT

## 2024-08-24 LAB
ESTRADIOL SERPL-MCNC: <5 PG/ML
FSH SERPL-ACNC: 58.8 MIU/ML
LH SERPL-ACNC: 26.3 MIU/ML

## 2024-08-26 ENCOUNTER — TELEPHONE (OUTPATIENT)
Dept: FAMILY MEDICINE | Facility: CLINIC | Age: 54
End: 2024-08-26
Payer: OTHER GOVERNMENT

## 2024-08-26 DIAGNOSIS — N95.1 VASOMOTOR SYMPTOMS DUE TO MENOPAUSE: Primary | ICD-10-CM

## 2024-08-26 NOTE — TELEPHONE ENCOUNTER
Spoke to patient with result verbatim per Tania. Verbalized understanding. Wants to know what her options are in dealing with hot flashes.

## 2024-08-26 NOTE — TELEPHONE ENCOUNTER
----- Message from LYNDA Dominguez sent at 8/26/2024 10:12 AM CDT -----  Labs show menopausal state.

## 2024-08-27 NOTE — TELEPHONE ENCOUNTER
Left patient message to call me back or that I was sending portal message to follow-up on our conversation yesterday, and to call with any questions.

## 2024-09-05 ENCOUNTER — PATIENT MESSAGE (OUTPATIENT)
Dept: FAMILY MEDICINE | Facility: CLINIC | Age: 54
End: 2024-09-05
Payer: OTHER GOVERNMENT

## 2024-09-05 DIAGNOSIS — Z12.83 SKIN CANCER SCREENING: ICD-10-CM

## 2024-09-05 DIAGNOSIS — L98.9 SKIN LESION: Primary | ICD-10-CM

## 2024-09-08 ENCOUNTER — PATIENT MESSAGE (OUTPATIENT)
Dept: FAMILY MEDICINE | Facility: CLINIC | Age: 54
End: 2024-09-08
Payer: OTHER GOVERNMENT

## 2024-09-09 ENCOUNTER — TELEPHONE (OUTPATIENT)
Dept: FAMILY MEDICINE | Facility: CLINIC | Age: 54
End: 2024-09-09
Payer: OTHER GOVERNMENT

## 2024-09-09 DIAGNOSIS — M13.0 INFLAMMATION OF MULTIPLE JOINTS: Primary | ICD-10-CM

## 2024-09-09 DIAGNOSIS — N95.1 VASOMOTOR SYMPTOMS DUE TO MENOPAUSE: ICD-10-CM

## 2024-09-09 RX ORDER — DICLOFENAC SODIUM 75 MG/1
75 TABLET, DELAYED RELEASE ORAL 2 TIMES DAILY PRN
Qty: 60 TABLET | Refills: 2 | Status: SHIPPED | OUTPATIENT
Start: 2024-09-09

## 2024-09-17 ENCOUNTER — TELEPHONE (OUTPATIENT)
Dept: FAMILY MEDICINE | Facility: CLINIC | Age: 54
End: 2024-09-17
Payer: OTHER GOVERNMENT

## 2024-09-17 ENCOUNTER — TELEPHONE (OUTPATIENT)
Dept: HEMATOLOGY/ONCOLOGY | Facility: CLINIC | Age: 54
End: 2024-09-17
Payer: OTHER GOVERNMENT

## 2024-09-17 ENCOUNTER — TELEPHONE (OUTPATIENT)
Dept: RHEUMATOLOGY | Facility: CLINIC | Age: 54
End: 2024-09-17
Payer: OTHER GOVERNMENT

## 2024-09-17 DIAGNOSIS — N95.1 VASOMOTOR SYMPTOMS DUE TO MENOPAUSE: Primary | ICD-10-CM

## 2024-09-17 RX ORDER — CLONIDINE HYDROCHLORIDE 0.1 MG/1
1 TABLET, EXTENDED RELEASE ORAL 2 TIMES DAILY
Qty: 60 TABLET | Refills: 2 | Status: SHIPPED | OUTPATIENT
Start: 2024-09-17

## 2024-09-17 NOTE — TELEPHONE ENCOUNTER
----- Message from Marilee Kay sent at 9/17/2024 12:23 PM CDT -----  Pt is returning a phone call about [facility] and would like if there is an another option.   [phone]

## 2024-09-17 NOTE — NURSING
JOSE ROBERTO with contact information. Ms. Hummel has a referral to see Dr. Reddy for breast implant capsular contraction. Per Dr. Reddy, Ms. Hummel needs to establish care with a plastic surgeon to address this issue.     Requested return call from Ms. Hummel to further discuss. Will send inbakset msg to LYNDA Gardner to discuss.

## 2024-09-17 NOTE — TELEPHONE ENCOUNTER
----- Message from Erin Londono sent at 9/16/2024  4:06 PM CDT -----  Type: Needs Medical Advice  Who Called:  Patient   Symptoms (please be specific):    How long has patient had these symptoms:    Pharmacy name and phone #:    Best Call Back Number: [phone]  Additional Information: Patient is requesting a call back to schedule a NP appt. with Dr. Doty from referral.

## 2024-09-17 NOTE — TELEPHONE ENCOUNTER
Left message to call us back to make a new pt appt.   ----- Message from Raquel Ortiz sent at 9/16/2024  4:18 PM CDT -----  Type :  Needs Medical Advice    Who Called: pt  Symptoms (please be specific): Rheumatoid Arthritis   How long has patient had these symptoms:  Rheumatoid Arthritis  Pharm[facility] and phone #:  [facility]  Would the patient rather a call back or a response via [facility]? call  Best Call Back Number: [phone]  Additional Information:  appt

## 2024-09-24 ENCOUNTER — PATIENT MESSAGE (OUTPATIENT)
Dept: FAMILY MEDICINE | Facility: CLINIC | Age: 54
End: 2024-09-24
Payer: OTHER GOVERNMENT

## 2024-10-01 ENCOUNTER — OFFICE VISIT (OUTPATIENT)
Dept: FAMILY MEDICINE | Facility: CLINIC | Age: 54
End: 2024-10-01
Payer: OTHER GOVERNMENT

## 2024-10-01 VITALS
OXYGEN SATURATION: 96 % | DIASTOLIC BLOOD PRESSURE: 70 MMHG | HEART RATE: 77 BPM | SYSTOLIC BLOOD PRESSURE: 120 MMHG | WEIGHT: 176.19 LBS | HEIGHT: 66 IN | BODY MASS INDEX: 28.32 KG/M2

## 2024-10-01 DIAGNOSIS — I10 HYPERTENSION, UNSPECIFIED TYPE: ICD-10-CM

## 2024-10-01 DIAGNOSIS — F41.9 ANXIETY: ICD-10-CM

## 2024-10-01 DIAGNOSIS — M13.0 INFLAMMATION OF MULTIPLE JOINTS: ICD-10-CM

## 2024-10-01 DIAGNOSIS — K21.9 GASTROESOPHAGEAL REFLUX DISEASE, UNSPECIFIED WHETHER ESOPHAGITIS PRESENT: ICD-10-CM

## 2024-10-01 DIAGNOSIS — R76.8 POSITIVE ANA (ANTINUCLEAR ANTIBODY): Primary | ICD-10-CM

## 2024-10-01 PROCEDURE — 99213 OFFICE O/P EST LOW 20 MIN: CPT | Mod: S$GLB,,,

## 2024-10-01 NOTE — PROGRESS NOTES
"  SUBJECTIVE:    Patient ID: Joanne Hummel is a 53 y.o. female.    Chief Complaint: Follow-up (6 week follow up/ not taking medications/ has questions about referrals/ declines flu vaccine//mp)    53 year old female presents today for follow up from previous visit.   Patient has ongoing conditions.  At her last visit, we discussed seeing dermatology for  "skin mapping," and she has made an appointment for this.    She is still consuming excess alcohol. She does want to start counseling. She has not yet, and does not want medications.    She has seen several breast surgeons and all have told her she needs to see a plastic surgeon about having her implants removed.    She still wants to see rheumatology. I advised that referral is still good and she needs to call to schedule. She states she needs a new referral.    Still having vasomotor menopausal symptoms, but does not want any new medications.    Has stopped taking all her medications for anxiety/depression except for sertraline.    Shingles Vaccine(1 of 2) Never done  Colorectal Cancer Screening due on 01/13/2025  Mammogram due on 06/26/2025  TETANUS VACCINE due on 04/10/2028  Lipid Panel due on 02/23/2029  Hepatitis C Screening Completed      Follow-up  Associated symptoms include arthralgias and neck pain. Pertinent negatives include no chest pain, headaches, joint swelling, vomiting or weakness.       Lab Visit on 08/22/2024   Component Date Value Ref Range Status    LH 08/22/2024 26.3  mIU/mL Final    Follicle Stimulating Hormone 08/22/2024 58.8  mIU/mL Final    Estradiol 08/22/2024 <5.0  pg/mL Final       Past Medical History:   Diagnosis Date    Allergy     Anemia     Anxiety     Arthritis     Depression     Personal history of colonic polyps 01/13/2020    Colonoscopy Gila Dias MD     Social History     Socioeconomic History    Marital status:    Tobacco Use    Smoking status: Former     Current packs/day: 1.50     Average packs/day: 1.5 " packs/day for 8.8 years (13.2 ttl pk-yrs)     Types: Cigarettes     Start date: 2016     Quit date: 1983    Smokeless tobacco: Never   Substance and Sexual Activity    Alcohol use: Yes     Comment: social    Drug use: Never    Sexual activity: Yes     Partners: Male     Social Drivers of Health     Financial Resource Strain: Low Risk  (2/14/2024)    Overall Financial Resource Strain (CARDIA)     Difficulty of Paying Living Expenses: Not hard at all   Food Insecurity: No Food Insecurity (2/14/2024)    Hunger Vital Sign     Worried About Running Out of Food in the Last Year: Never true     Ran Out of Food in the Last Year: Never true   Transportation Needs: No Transportation Needs (2/14/2024)    PRAPARE - Transportation     Lack of Transportation (Medical): No     Lack of Transportation (Non-Medical): No   Physical Activity: Inactive (2/14/2024)    Exercise Vital Sign     Days of Exercise per Week: 0 days     Minutes of Exercise per Session: 0 min   Stress: Stress Concern Present (2/14/2024)    [employer] - Occupational Stress Questionnaire     Feeling of Stress : Very much   Housing Stability: Low Risk  (2/14/2024)    Housing Stability Vital Sign     Unable to Pay for Housing in the Last Year: No     Number of Places Lived in the Last Year: 2     Unstable Housing in the Last Year: No     Past Surgical History:   Procedure Laterality Date    AUGMENTATION OF BREAST Bilateral     colonscopy      EYE SURGERY      TONSILLECTOMY       Family History   Problem Relation Name Age of Onset    Arthritis Mother      Skin cancer Mother      Alcohol abuse Father      COPD Maternal Grandfather      Alcohol abuse Paternal Grandfather         The 10-year CVD risk score (Ras et al., 2008) is: 4.9%    Values used to calculate the score:      Age: 53 years      Sex: Female      Diabetic: No      Tobacco smoker: No      Systolic Blood Pressure: 120 mmHg      Is BP treated: Yes      HDL Cholesterol:  78 mg/dL      Total Cholesterol: 210 mg/dL    All of your core healthy metrics are met.      Review of patient's allergies indicates:   Allergen Reactions    Epinephrine Palpitations       Current Outpatient Medications:     calcipotriene (DOVONOX) 0.005 % ointment, , Disp: , Rfl:     calcium-vitamin D 600 mg-10 mcg (400 unit) Tab, Take by mouth., Disp: , Rfl:     clobetasol 0.05% (TEMOVATE) 0.05 % Oint, Apply topically 2 (two) times daily., Disp: , Rfl:     COLLAGEN-BIOTIN-ASCORBIC ACID ORAL, Take 1 capsule by mouth once daily., Disp: , Rfl:     DHA-EPA-LIPOTROPIC AGENT-VIT E ORAL, , Disp: , Rfl:     hydrOXYzine HCL (ATARAX) 10 MG Tab, Take 1 tablet (10 mg total) by mouth 3 (three) times daily as needed for Anxiety., Disp: 90 tablet, Rfl: 11    sertraline (ZOLOFT) 100 MG tablet, Take 2 tablets (200 mg total) by mouth once daily., Disp: 180 tablet, Rfl: 1    tretinoin (RETIN-A) 0.05 % cream, , Disp: , Rfl:     folic acid (FOLVITE) 1 MG tablet, Take 1 tablet (1 mg total) by mouth once daily., Disp: 90 tablet, Rfl: 1    meloxicam (MOBIC) 7.5 MG tablet, Take 1 tablet (7.5 mg total) by mouth once daily., Disp: 90 tablet, Rfl: 1    metoprolol succinate (TOPROL XL) 25 MG 24 hr tablet, Take 1 tablet (25 mg total) by mouth once daily., Disp: 90 tablet, Rfl: 1    omeprazole (PRILOSEC) 20 MG capsule, Take 1 capsule (20 mg total) by mouth once daily., Disp: 90 capsule, Rfl: 1    Review of Systems   Constitutional:  Negative for activity change and unexpected weight change.   HENT:  Positive for rhinorrhea. Negative for hearing loss and trouble swallowing.    Eyes:  Negative for discharge and visual disturbance.   Respiratory:  Negative for chest tightness and wheezing.    Cardiovascular:  Positive for palpitations. Negative for chest pain.   Gastrointestinal:  Positive for constipation and diarrhea. Negative for blood in stool and vomiting.   Endocrine: Negative for polydipsia and polyuria.   Genitourinary:  Negative for  "difficulty urinating, dysuria, hematuria and menstrual problem.   Musculoskeletal:  Positive for arthralgias and neck pain. Negative for joint swelling.   Neurological:  Negative for weakness and headaches.   Psychiatric/Behavioral:  Positive for dysphoric mood. Negative for confusion.            Objective:      Vitals:    10/01/24 1440   BP: 120/70   Pulse: 77   SpO2: 96%   Weight: 79.9 kg (176 lb 3.2 oz)   Height: 5' 6" (1.676 m)     Physical Exam  Vitals and nursing note reviewed.   Constitutional:       General: She is not in acute distress.     Appearance: Normal appearance. She is well-developed and normal weight. She is not ill-appearing.   HENT:      Head: Normocephalic and atraumatic.      Nose: Nose normal.      Mouth/Throat:      Mouth: Mucous membranes are moist.      Pharynx: Oropharynx is clear.   Eyes:      General: No scleral icterus.     Pupils: Pupils are equal, round, and reactive to light.   Neck:      Thyroid: No thyromegaly.      Vascular: No carotid bruit.   Cardiovascular:      Rate and Rhythm: Normal rate and regular rhythm.      Pulses: Normal pulses.      Heart sounds: Normal heart sounds. No murmur heard.  Pulmonary:      Effort: Pulmonary effort is normal.      Breath sounds: Normal breath sounds. No wheezing or rales.   Abdominal:      Palpations: Abdomen is soft.      Tenderness: There is no abdominal tenderness.   Musculoskeletal:         General: Normal range of motion.      Lumbar back: Normal. No spasms.      Right lower leg: No edema.      Left lower leg: No edema.   Skin:     General: Skin is warm and dry.      Capillary Refill: Capillary refill takes less than 2 seconds.      Coloration: Skin is not jaundiced or pale.   Neurological:      General: No focal deficit present.      Mental Status: She is alert and oriented to person, place, and time. Mental status is at baseline.      Cranial Nerves: No cranial nerve deficit.      Sensory: No sensory deficit.      Motor: No weakness. "      Gait: Gait normal.   Psychiatric:         Mood and Affect: Mood normal.         Behavior: Behavior normal. Behavior is cooperative.         Thought Content: Thought content normal.         Judgment: Judgment normal.           Assessment:       1. Positive SHARAN (antinuclear antibody)    2. Inflammation of multiple joints    3. Anxiety    4. Hypertension, unspecified type    5. Gastroesophageal reflux disease, unspecified whether esophagitis present         Plan:       Positive SHARAN (antinuclear antibody)  Inflammation of multiple joints  New referral at patient's request  -     Ambulatory referral/consult to Rheumatology; Future; Expected date: 10/08/2024    Anxiety  Continue sertraline as ordered. Does not need refills today.    Hypertension, unspecified type  Blood pressure goals discussed with patient.  Tolerating current medications.  BP controlled today.  Continue current management.    Gastroesophageal reflux disease, unspecified whether esophagitis present  Continue omeprazole as ordered. Does not need refills today.      Follow up in about 6 months (around 4/1/2025).        10/20/2024 Alyx English

## 2024-10-03 DIAGNOSIS — M25.50 MULTIPLE JOINT PAIN: ICD-10-CM

## 2024-10-03 DIAGNOSIS — M13.0 INFLAMMATION OF MULTIPLE JOINTS: ICD-10-CM

## 2024-10-03 DIAGNOSIS — R76.8 POSITIVE ANA (ANTINUCLEAR ANTIBODY): Primary | ICD-10-CM

## 2024-10-06 DIAGNOSIS — K21.9 GASTROESOPHAGEAL REFLUX DISEASE, UNSPECIFIED WHETHER ESOPHAGITIS PRESENT: ICD-10-CM

## 2024-10-06 DIAGNOSIS — I10 HYPERTENSION, UNSPECIFIED TYPE: ICD-10-CM

## 2024-10-06 DIAGNOSIS — F10.10 ALCOHOL ABUSE: ICD-10-CM

## 2024-10-06 DIAGNOSIS — M25.522 LEFT ELBOW PAIN: ICD-10-CM

## 2024-10-07 RX ORDER — METOPROLOL SUCCINATE 25 MG/1
25 TABLET, EXTENDED RELEASE ORAL DAILY
Qty: 90 TABLET | Refills: 1 | Status: SHIPPED | OUTPATIENT
Start: 2024-10-07

## 2024-10-07 RX ORDER — MELOXICAM 7.5 MG/1
7.5 TABLET ORAL DAILY
Qty: 90 TABLET | Refills: 1 | Status: SHIPPED | OUTPATIENT
Start: 2024-10-07

## 2024-10-07 RX ORDER — OMEPRAZOLE 20 MG/1
20 CAPSULE, DELAYED RELEASE ORAL DAILY
Qty: 90 CAPSULE | Refills: 1 | Status: SHIPPED | OUTPATIENT
Start: 2024-10-07

## 2024-10-07 RX ORDER — FOLIC ACID 1 MG/1
1 TABLET ORAL DAILY
Qty: 90 TABLET | Refills: 1 | Status: SHIPPED | OUTPATIENT
Start: 2024-10-07

## 2024-10-17 ENCOUNTER — PATIENT MESSAGE (OUTPATIENT)
Dept: FAMILY MEDICINE | Facility: CLINIC | Age: 54
End: 2024-10-17
Payer: OTHER GOVERNMENT

## 2024-10-20 PROBLEM — N95.1 VASOMOTOR SYMPTOMS DUE TO MENOPAUSE: Status: ACTIVE | Noted: 2024-10-20

## 2024-10-20 PROBLEM — K21.9 GASTROESOPHAGEAL REFLUX DISEASE: Status: ACTIVE | Noted: 2024-10-20

## 2024-11-08 ENCOUNTER — PATIENT MESSAGE (OUTPATIENT)
Dept: FAMILY MEDICINE | Facility: CLINIC | Age: 54
End: 2024-11-08
Payer: OTHER GOVERNMENT

## 2024-11-08 DIAGNOSIS — F41.9 ANXIETY: ICD-10-CM

## 2024-11-11 RX ORDER — HYDROXYZINE HYDROCHLORIDE 10 MG/1
10 TABLET, FILM COATED ORAL 3 TIMES DAILY PRN
Qty: 90 TABLET | Refills: 11 | Status: SHIPPED | OUTPATIENT
Start: 2024-11-11

## 2024-11-11 RX ORDER — TRETINOIN 1 MG/G
CREAM TOPICAL NIGHTLY
Qty: 20 G | Refills: 0 | Status: SHIPPED | OUTPATIENT
Start: 2024-11-11

## 2024-12-04 ENCOUNTER — TELEPHONE (OUTPATIENT)
Dept: FAMILY MEDICINE | Facility: CLINIC | Age: 54
End: 2024-12-04
Payer: OTHER GOVERNMENT

## 2024-12-04 NOTE — TELEPHONE ENCOUNTER
LMOR for pt to call back to schedule appt. Pt sent message via Pt appt request in regards to foreign body in her big toe. Attempted to have her send in pic through portal but never received message.

## 2024-12-05 ENCOUNTER — OFFICE VISIT (OUTPATIENT)
Dept: FAMILY MEDICINE | Facility: CLINIC | Age: 54
End: 2024-12-05
Payer: OTHER GOVERNMENT

## 2024-12-05 VITALS
OXYGEN SATURATION: 96 % | BODY MASS INDEX: 28.77 KG/M2 | HEART RATE: 69 BPM | HEIGHT: 66 IN | SYSTOLIC BLOOD PRESSURE: 116 MMHG | DIASTOLIC BLOOD PRESSURE: 72 MMHG | WEIGHT: 179 LBS

## 2024-12-05 DIAGNOSIS — M79.676 PAIN OF GREAT TOE, UNSPECIFIED LATERALITY: Primary | ICD-10-CM

## 2024-12-05 PROCEDURE — 99213 OFFICE O/P EST LOW 20 MIN: CPT | Mod: S$GLB,,,

## 2024-12-05 RX ORDER — FERROUS SULFATE 325(65) MG
TABLET ORAL DAILY
COMMUNITY
Start: 2024-11-01

## 2024-12-13 NOTE — PROGRESS NOTES
SUBJECTIVE:    Patient ID: Joanne Hummel is a 54 y.o. female.    Chief Complaint: Foreign Body in Skin (No bottles//Pt here for splinter or foreign body in her right big toe that has been there for 2 weeks. Pt states she has dug it out twice and it has come back. She can walk on it without pain but she can feel the site. Had not used anything on it//declines flu vacc//JL)    HPI  History of Present Illness    CHIEF COMPLAINT:  Joanne presents today for evaluation of a painful spot on foot.    FOOT PAIN:  She reports a painful spot on her foot that causes discomfort when walking, particularly when applying direct pressure to the affected area. She has attempted self-removal twice using a blackhead removal tool, resulting in minimal bleeding and recurrence of the spot after each attempt.      ROS:  General: -fever, -chills, -fatigue, -weight gain, -weight loss  Eyes: -vision changes, -redness, -discharge  ENT: -ear pain, -nasal congestion, -sore throat  Cardiovascular: -chest pain, -palpitations, -lower extremity edema  Respiratory: -cough, -shortness of breath  Gastrointestinal: -abdominal pain, -nausea, -vomiting, -diarrhea, -constipation, -blood in stool  Genitourinary: -dysuria, -hematuria, -frequency  Musculoskeletal: -joint pain, -muscle pain  Skin: -rash, +lesion  Neurological: -headache, -dizziness, -numbness, -tingling  Psychiatric: -anxiety, -depression, -sleep difficulty         Lab Visit on 08/22/2024   Component Date Value Ref Range Status    LH 08/22/2024 26.3  mIU/mL Final    Follicle Stimulating Hormone 08/22/2024 58.8  mIU/mL Final    Estradiol 08/22/2024 <5.0  pg/mL Final       Past Medical History:   Diagnosis Date    Allergy     Anemia     Anxiety     Arthritis     Depression     Personal history of colonic polyps 01/13/2020    Colonoscopy Gila Dias MD     Social History     Socioeconomic History    Marital status:    Tobacco Use    Smoking status: Former     Current packs/day: 1.50      Average packs/day: 1.5 packs/day for 8.9 years (13.4 ttl pk-yrs)     Types: Cigarettes     Start date: 2016     Quit date: 1983    Smokeless tobacco: Never   Substance and Sexual Activity    Alcohol use: Yes     Comment: social    Drug use: Never    Sexual activity: Yes     Partners: Male     Social Drivers of Health     Financial Resource Strain: Low Risk  (2/14/2024)    Overall Financial Resource Strain (CARDIA)     Difficulty of Paying Living Expenses: Not hard at all   Food Insecurity: No Food Insecurity (2/14/2024)    Hunger Vital Sign     Worried About Running Out of Food in the Last Year: Never true     Ran Out of Food in the Last Year: Never true   Transportation Needs: No Transportation Needs (2/14/2024)    PRAPARE - Transportation     Lack of Transportation (Medical): No     Lack of Transportation (Non-Medical): No   Physical Activity: Inactive (2/14/2024)    Exercise Vital Sign     Days of Exercise per Week: 0 days     Minutes of Exercise per Session: 0 min   Stress: Stress Concern Present (2/14/2024)    [employer] - Occupational Stress Questionnaire     Feeling of Stress : Very much   Housing Stability: Low Risk  (2/14/2024)    Housing Stability Vital Sign     Unable to Pay for Housing in the Last Year: No     Number of Places Lived in the Last Year: 2     Unstable Housing in the Last Year: No     Past Surgical History:   Procedure Laterality Date    AUGMENTATION OF BREAST Bilateral     colonscopy      EYE SURGERY      TONSILLECTOMY       Family History   Problem Relation Name Age of Onset    Arthritis Mother      Skin cancer Mother      Alcohol abuse Father      COPD Maternal Grandfather      Alcohol abuse Paternal Grandfather         The 10-year CVD risk score (DENISE'Agoanna et al., 2008) is: 4.7%    Values used to calculate the score:      Age: 54 years      Sex: Female      Diabetic: No      Tobacco smoker: No      Systolic Blood Pressure: 116 mmHg      Is BP treated:  "Yes      HDL Cholesterol: 78 mg/dL      Total Cholesterol: 210 mg/dL    All of your core healthy metrics are met.      Review of patient's allergies indicates:   Allergen Reactions    Epinephrine Palpitations       Current Outpatient Medications:     calcium-vitamin D 600 mg-10 mcg (400 unit) Tab, Take by mouth., Disp: , Rfl:     clobetasol 0.05% (TEMOVATE) 0.05 % Oint, Apply topically 2 (two) times daily., Disp: , Rfl:     COLLAGEN-BIOTIN-ASCORBIC ACID ORAL, Take 1 capsule by mouth once daily., Disp: , Rfl:     DHA-EPA-LIPOTROPIC AGENT-VIT E ORAL, , Disp: , Rfl:     ferrous sulfate (IRON) 325 mg (65 mg iron) Tab tablet, once daily., Disp: , Rfl:     folic acid (FOLVITE) 1 MG tablet, Take 1 tablet (1 mg total) by mouth once daily., Disp: 90 tablet, Rfl: 1    hydrOXYzine HCL (ATARAX) 10 MG Tab, Take 1 tablet (10 mg total) by mouth 3 (three) times daily as needed., Disp: 90 tablet, Rfl: 11    meloxicam (MOBIC) 7.5 MG tablet, Take 1 tablet (7.5 mg total) by mouth once daily., Disp: 90 tablet, Rfl: 1    metoprolol succinate (TOPROL XL) 25 MG 24 hr tablet, Take 1 tablet (25 mg total) by mouth once daily., Disp: 90 tablet, Rfl: 1    omeprazole (PRILOSEC) 20 MG capsule, Take 1 capsule (20 mg total) by mouth once daily., Disp: 90 capsule, Rfl: 1    sertraline (ZOLOFT) 100 MG tablet, Take 2 tablets (200 mg total) by mouth once daily., Disp: 180 tablet, Rfl: 1    tretinoin (RETIN-A) 0.1 % cream, Apply topically every evening., Disp: 20 g, Rfl: 0    calcipotriene (DOVONOX) 0.005 % ointment, , Disp: , Rfl:     Review of Systems        Objective:      Vitals:    12/05/24 1222   BP: 116/72   Pulse: 69   SpO2: 96%   Weight: 81.2 kg (179 lb)   Height: 5' 6" (1.676 m)     Physical Exam  Physical Exam    Constitutional: In no acute distress. Normal appearance. Well-developed. Not ill-appearing.  HENT: Normocephalic. Atraumatic. External ears normal bilaterally. Nose normal. Normal lips. Oropharynx clear.  Eyes: No scleral icterus. " Pupils are equal, round, and reactive to light.  Cardiovascular: Normal rate   Pulmonary: Pulmonary effort is normal  Skin: Warm. Dry. Capillary refill takes less than 2 seconds. Callus or corn on sole of great toe.             Assessment:       1. Pain of great toe, unspecified laterality         Plan:       Pain of great toe, unspecified laterality      Assessment & Plan    IMPRESSION:  - Assessed patient's foot pain, initially suspecting a potential foreign body  - Examined affected area, noting scab-like appearance  - Considered possibility of corn or callus formation  - Attempted to extract potential foreign material, finding no significant object  - Concluded issue is likely a corn with associated skin irritation    CORN (UNSPECIFIED DISORDER OF CORNEA):  - Explained what a corn is: a hardening buildup of skin that can feel like stepping on a rock.  - Joanne to use OTC corn pads to treat the affected area.  - Started topical antibiotic ointment for application to affected area.  - Follow up if no improvement is seen with corn pad treatment.  - May require numbing and incision or referral to a podiatrist if condition persists.         Follow up if symptoms worsen or fail to improve.        This note was generated with the assistance of ambient listening technology. Verbal consent was obtained by the patient and accompanying visitor(s) for the recording of patient appointment to facilitate this note. I attest to having reviewed and edited the generated note for accuracy, though some syntax or spelling errors may persist. Please contact the author of this note for any clarification.      12/13/2024 Alyx English

## 2024-12-19 ENCOUNTER — PATIENT MESSAGE (OUTPATIENT)
Dept: FAMILY MEDICINE | Facility: CLINIC | Age: 54
End: 2024-12-19
Payer: OTHER GOVERNMENT

## 2024-12-19 DIAGNOSIS — M25.50 MULTIPLE JOINT PAIN: Primary | ICD-10-CM

## 2024-12-19 RX ORDER — DICLOFENAC SODIUM 10 MG/G
2 GEL TOPICAL 4 TIMES DAILY PRN
Qty: 150 G | Refills: 5 | Status: SHIPPED | OUTPATIENT
Start: 2024-12-19

## 2024-12-30 ENCOUNTER — OFFICE VISIT (OUTPATIENT)
Dept: RHEUMATOLOGY | Facility: CLINIC | Age: 54
End: 2024-12-30
Payer: OTHER GOVERNMENT

## 2024-12-30 VITALS
HEART RATE: 89 BPM | DIASTOLIC BLOOD PRESSURE: 84 MMHG | WEIGHT: 177.69 LBS | SYSTOLIC BLOOD PRESSURE: 157 MMHG | HEIGHT: 66 IN | BODY MASS INDEX: 28.56 KG/M2

## 2024-12-30 DIAGNOSIS — Z71.89 COUNSELING AND COORDINATION OF CARE: Primary | ICD-10-CM

## 2024-12-30 DIAGNOSIS — R76.8 POSITIVE ANA (ANTINUCLEAR ANTIBODY): ICD-10-CM

## 2024-12-30 DIAGNOSIS — M25.50 ARTHRALGIA, UNSPECIFIED JOINT: ICD-10-CM

## 2024-12-30 PROBLEM — M13.0 INFLAMMATION OF MULTIPLE JOINTS: Status: RESOLVED | Noted: 2024-03-13 | Resolved: 2024-12-30

## 2024-12-30 PROCEDURE — 99215 OFFICE O/P EST HI 40 MIN: CPT | Mod: PBBFAC,PN | Performed by: STUDENT IN AN ORGANIZED HEALTH CARE EDUCATION/TRAINING PROGRAM

## 2024-12-30 PROCEDURE — 99999 PR PBB SHADOW E&M-EST. PATIENT-LVL V: CPT | Mod: PBBFAC,,, | Performed by: STUDENT IN AN ORGANIZED HEALTH CARE EDUCATION/TRAINING PROGRAM

## 2024-12-30 PROCEDURE — 99204 OFFICE O/P NEW MOD 45 MIN: CPT | Mod: S$PBB,,, | Performed by: STUDENT IN AN ORGANIZED HEALTH CARE EDUCATION/TRAINING PROGRAM

## 2024-12-30 RX ORDER — MELOXICAM 15 MG/1
15 TABLET ORAL DAILY
Qty: 30 TABLET | Refills: 5 | Status: SHIPPED | OUTPATIENT
Start: 2024-12-30

## 2024-12-30 NOTE — PROGRESS NOTES
RHEUMATOLOGY OUTPATIENT CLINIC NOTE    12/30/2024    Attending Rheumatologist: Blanca Goyal  Primary Care Provider: Alyx English APRN-CNP   Physician Requesting Consultation: Alyx English APRN-CNP  [address] [facility]  [address]  [place]  [place] [address]  Chief Complaint/Reason For Consultation:  Positive SHARAN and Joint Pain      Subjective:       HPI  Joanne Hummel is a 54 y.o. White female who comes for evaluation of positive SHARAN     She reports joint pain for about 5 years that has been progressively getting worse.   She reports pain in bilateral hands, worse in morning, she has not noted any swelling. She describes it as a tightness. Symptoms improves within 5 minutes.   Also reports pain in bilateral lateral epicondyles, intermittent, worse when playing xbox.   Reports pain in bilateral shoulders, worse with sleeping as she sleeps with arms up.   She reports pain in bilateral lateral hips when trying to get up.   Reports swelling in left ankle at the end of the day.   She has chronic low back and neck pain. Has been told she has arthritis. She currently takes meloxicam 7.5 mg in the morning which helps her back pain but does not help other joint pain. She has been on meloxicam for at least 3 years.     She denies any skin rashes, oral ulcers, swollen glands, raynaud's, sclerodactyly, dysphagia, CP, SOB, hematochezia.   She has been having hot flashes.     Denies any family history of autoimmune disease.     She is retired. 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   Quit smoking in 2016. It was 1 pack per day.   She used to vape.   She drinks alcohol every day - 2-3 vodka soda  No illicit drugs    Labs  2/2024  SHARAN 1:160 nuclear dense fine speckled 1:80 cytoplasmic  RF negative   ESR, CRP negative   CMP normal     Review of Systems   Constitutional:  Negative for fever and unexpected weight change.   HENT:  Positive for mouth sores. Negative for  trouble swallowing.    Eyes:  Negative for redness.   Respiratory:  Negative for cough and shortness of breath.    Cardiovascular:  Negative for chest pain.   Gastrointestinal:  Positive for constipation and diarrhea.   Genitourinary:  Negative for dysuria and genital sores.   Integumentary:  Negative for rash.   Neurological:  Negative for headaches.   Hematological:  Bruises/bleeds easily.        Chronic comorbid conditions affecting medical decision making today:  Past Medical History:   Diagnosis Date    Allergy     Anemia     Anxiety     Arthritis     Depression     Personal history of colonic polyps 01/13/2020    Colonoscopy Gila Dias MD     Past Surgical History:   Procedure Laterality Date    AUGMENTATION OF BREAST Bilateral     colonscopy      EYE SURGERY      TONSILLECTOMY       Family History   Problem Relation Name Age of Onset    Arthritis Mother      Skin cancer Mother      Alcohol abuse Father      COPD Maternal Grandfather      Alcohol abuse Paternal Grandfather       Social History     Substance and Sexual Activity   Alcohol Use Yes    Comment: daily 2-3 drinks     Social History     Tobacco Use   Smoking Status Former    Current packs/day: 1.50    Average packs/day: 1.5 packs/day for 9.0 years (13.5 ttl pk-yrs)    Types: Cigarettes    Start date: 2016    Quit date: 1983   Smokeless Tobacco Never     Social History     Substance and Sexual Activity   Drug Use Never       Current Outpatient Medications:     calcipotriene (DOVONOX) 0.005 % ointment, , Disp: , Rfl:     calcium-vitamin D 600 mg-10 mcg (400 unit) Tab, Take by mouth., Disp: , Rfl:     clobetasol 0.05% (TEMOVATE) 0.05 % Oint, Apply topically 2 (two) times daily., Disp: , Rfl:     COLLAGEN-BIOTIN-ASCORBIC ACID ORAL, Take 1 capsule by mouth once daily., Disp: , Rfl:     DHA-EPA-LIPOTROPIC AGENT-VIT E ORAL, , Disp: , Rfl:     diclofenac sodium (VOLTAREN ARTHRITIS PAIN) 1 % Gel, Apply 2 g topically 4 (four) times daily as needed  (pain)., Disp: 150 g, Rfl: 5    ferrous sulfate (IRON) 325 mg (65 mg iron) Tab tablet, once daily., Disp: , Rfl:     folic acid (FOLVITE) 1 MG tablet, Take 1 tablet (1 mg total) by mouth once daily., Disp: 90 tablet, Rfl: 1    hydrOXYzine HCL (ATARAX) 10 MG Tab, Take 1 tablet (10 mg total) by mouth 3 (three) times daily as needed., Disp: 90 tablet, Rfl: 11    metoprolol succinate (TOPROL XL) 25 MG 24 hr tablet, Take 1 tablet (25 mg total) by mouth once daily., Disp: 90 tablet, Rfl: 1    omeprazole (PRILOSEC) 20 MG capsule, Take 1 capsule (20 mg total) by mouth once daily., Disp: 90 capsule, Rfl: 1    sertraline (ZOLOFT) 100 MG tablet, Take 2 tablets (200 mg total) by mouth once daily., Disp: 180 tablet, Rfl: 1    tretinoin (RETIN-A) 0.1 % cream, Apply topically every evening., Disp: 20 g, Rfl: 0    meloxicam (MOBIC) 15 MG tablet, Take 1 tablet (15 mg total) by mouth once daily., Disp: 30 tablet, Rfl: 5     Objective:         Vitals:    12/30/24 1427   BP: (!) 157/84   Pulse: 89     Physical Exam   Constitutional: She is oriented to person, place, and time. She appears well-developed.   HENT:   Head: Normocephalic.   Pulmonary/Chest: Effort normal.   Musculoskeletal:      Cervical back: Neck supple.      Comments: Shoulders: FROM; no synovitis;  Elbows: FROM; no synovitis; no tophi or nodules  Wrists: FROM; no synovitis;    MCPs: FROM; no synovitis;   PIPs:FROM; no synovitis;   DIPs: FROM; no synovitis; mild heberden nodes  HIPS: FROM  Knees: FROM; no synovitis; no instability  Ankles: FROM: no synovitis   Toes: no tenderness on palpation.     Lymphadenopathy:     She has no cervical adenopathy.   Neurological: She is alert and oriented to person, place, and time.   Skin: No rash noted.   No Heliotrope rash  No Gottron papules  No sclerodactyly   No Raynaud's  No Petechiae  No discoid lesions  No alopecia  Normal Capillaroscopy   Vitals reviewed.      Reviewed old and all outside pertinent medical records  "available.    All lab results personally reviewed and interpreted by me.  Lab Results   Component Value Date    WBC 5.4 02/23/2024    HGB 12.2 02/23/2024    HCT 35.8 02/23/2024    MCV 86.7 02/23/2024    MCH 29.5 02/23/2024    MCHC 34.1 02/23/2024    RDW 14.1 02/23/2024     02/23/2024    MPV 9.5 02/23/2024    NEUTROABS 2,948 02/23/2024       Lab Results   Component Value Date     02/23/2024    K 4.1 02/23/2024     02/23/2024    CO2 24 02/23/2024    GLU 86 02/23/2024    BUN 16 02/23/2024    CALCIUM 10.1 02/23/2024    PROT 7.6 02/23/2024    ALBUMIN 4.6 02/23/2024    BILITOT 0.4 02/23/2024    AST 24 02/23/2024    ALT 27 02/23/2024       Lab Results   Component Value Date    COLORU YELLOW 02/28/2024    APPEARANCEUA CLEAR 02/28/2024    SPECGRAV 1.010 02/28/2024    PHUR 6.0 02/28/2024    PROTEINUA NEGATIVE 02/28/2024    GLUCOSEU NEGATIVE 02/28/2024    KETONESU NEGATIVE 02/28/2024    LEUKOCYTESUR NEGATIVE 02/28/2024    NITRITE NEGATIVE 02/28/2024       Lab Results   Component Value Date    CRP 3.5 02/23/2024       Lab Results   Component Value Date    SHARAN POSITIVE (A) 02/23/2024    RF <14 02/23/2024    SEDRATE 28 02/23/2024       No components found for: "25OHVITDTOT", "86UAZRYR8", "36DCBUYB2", "METHODNOTE"    No results found for: "URICACID"    Lab Results   Component Value Date    HEPCAB Negative 05/09/2023     Imaging:  All imaging reviewed and independently interpreted by me.     ASSESSMENT / PLAN:     Joanne Hummel is a 54 y.o. White female with:    1. Positive SHARAN (antinuclear antibody)  -SHARAN 1: 160 nuclear dense fine speckled.   -no s/s consistent with CTD at this moment.   -SHARAN pattern is reassuring as it is seen in healthy patients.   -reassurance     2. Arthralgia, unspecified joint  -symptoms are likely related to mechanical/degenerative changes. RF negative. ESR normal.   -discussed about switching NSAIDS or increasing dose of mobic. She opted for increasing dose of Mobic. Sent new " prescription to pharmacy.   -reassurance.     3. Other specified counseling  - over 10 minutes spent regarding below topics:  - Nutrition and exercise counseling.  - Limitation of alcohol consumption.  - Regular exercise:  Aerobic and resistance.  - Medication counseling provided.    Follow up if symptoms worsen or fail to improve.    Method of contact with patient concerns: [facility] Rhe[facility]    Disclaimer:  This note is prepared using voice recognition software and as such is likely to have errors and has not been proof read. Please contact me for questions.     Blanca Goyal M.D.  Rheumatology  [facility]

## 2024-12-30 NOTE — PROGRESS NOTES
12/28/2024     8:50 PM   Rapid3 Question Responses and Scores   MDHAQ Score 0.2   Psychologic Score 4.4   Pain Score 3   When you awakened in the morning OVER THE LAST WEEK, did you feel stiff? Yes   If Yes, please indicate the number of hours until you are as limber as you will be for the day 0.5   Fatigue Score 8.5   Global Health Score 3   RAPID3 Score 2.22

## 2025-01-07 ENCOUNTER — PATIENT MESSAGE (OUTPATIENT)
Dept: FAMILY MEDICINE | Facility: CLINIC | Age: 55
End: 2025-01-07

## 2025-01-14 ENCOUNTER — PATIENT MESSAGE (OUTPATIENT)
Dept: FAMILY MEDICINE | Facility: CLINIC | Age: 55
End: 2025-01-14

## 2025-02-11 ENCOUNTER — PATIENT MESSAGE (OUTPATIENT)
Dept: FAMILY MEDICINE | Facility: CLINIC | Age: 55
End: 2025-02-11

## 2025-02-11 DIAGNOSIS — I49.3 VENTRICULAR PREMATURE BEATS: Primary | ICD-10-CM

## 2025-02-11 DIAGNOSIS — L30.9 ECZEMA, UNSPECIFIED TYPE: ICD-10-CM

## 2025-02-11 RX ORDER — METOPROLOL SUCCINATE 50 MG/1
50 TABLET, EXTENDED RELEASE ORAL DAILY
Qty: 90 TABLET | Refills: 1 | Status: SHIPPED | OUTPATIENT
Start: 2025-02-11

## 2025-02-11 NOTE — TELEPHONE ENCOUNTER
"Spoke with pt who states that the "hollow" heart beats happen randomly and only last for about 5 seconds. They do not cause any SOB, cough, light headedness, or any other symptom. Pt states it doesn't feel like a flutter or palpitation. Does not feel like heart is skipping or adding a beat she states the only way she can describe it is as a "hollow" HB. Pt feels she may need to increase her metoprolol. Informed her will contact her for recommendation through portal. She voiced understanding.   "

## 2025-02-13 RX ORDER — CLOBETASOL PROPIONATE 0.5 MG/G
OINTMENT TOPICAL 2 TIMES DAILY
Qty: 30 G | Refills: 2 | Status: SHIPPED | OUTPATIENT
Start: 2025-02-13

## 2025-02-19 ENCOUNTER — TELEPHONE (OUTPATIENT)
Dept: FAMILY MEDICINE | Facility: CLINIC | Age: 55
End: 2025-02-19

## 2025-02-19 NOTE — TELEPHONE ENCOUNTER
Spoke with pt and informed her verbatim per Alyx below. Pt voiced understanding and stated that she looked through the labs and did not see the ones she was looking for either but she wanted Alyx to see if she could see something he was missing. Pt states she does not need the lab results back.

## 2025-02-21 ENCOUNTER — PATIENT MESSAGE (OUTPATIENT)
Dept: FAMILY MEDICINE | Facility: CLINIC | Age: 55
End: 2025-02-21
Payer: OTHER GOVERNMENT

## 2025-02-21 DIAGNOSIS — F41.9 ANXIETY: Primary | ICD-10-CM

## 2025-02-24 RX ORDER — ALPRAZOLAM 0.25 MG/1
0.25 TABLET ORAL 2 TIMES DAILY PRN
Qty: 20 TABLET | Refills: 1 | Status: SHIPPED | OUTPATIENT
Start: 2025-02-24

## 2025-03-27 ENCOUNTER — OFFICE VISIT (OUTPATIENT)
Dept: FAMILY MEDICINE | Facility: CLINIC | Age: 55
End: 2025-03-27
Payer: OTHER GOVERNMENT

## 2025-03-27 ENCOUNTER — HOSPITAL ENCOUNTER (OUTPATIENT)
Dept: RADIOLOGY | Facility: HOSPITAL | Age: 55
Discharge: HOME | End: 2025-03-27
Payer: OTHER GOVERNMENT

## 2025-03-27 VITALS
SYSTOLIC BLOOD PRESSURE: 128 MMHG | OXYGEN SATURATION: 91 % | HEIGHT: 66 IN | BODY MASS INDEX: 28.9 KG/M2 | HEART RATE: 68 BPM | WEIGHT: 179.81 LBS | DIASTOLIC BLOOD PRESSURE: 78 MMHG

## 2025-03-27 DIAGNOSIS — M25.512 ACUTE PAIN OF LEFT SHOULDER: ICD-10-CM

## 2025-03-27 DIAGNOSIS — Z00.00 WELLNESS EXAMINATION: Primary | ICD-10-CM

## 2025-03-27 DIAGNOSIS — N95.1 VASOMOTOR SYMPTOMS DUE TO MENOPAUSE: ICD-10-CM

## 2025-03-27 DIAGNOSIS — M54.9 UPPER BACK PAIN: ICD-10-CM

## 2025-03-27 DIAGNOSIS — Z13.220 ENCOUNTER FOR LIPID SCREENING FOR CARDIOVASCULAR DISEASE: ICD-10-CM

## 2025-03-27 DIAGNOSIS — Z12.11 ENCOUNTER FOR SCREENING FOR MALIGNANT NEOPLASM OF COLON: ICD-10-CM

## 2025-03-27 DIAGNOSIS — Z13.6 ENCOUNTER FOR LIPID SCREENING FOR CARDIOVASCULAR DISEASE: ICD-10-CM

## 2025-03-27 DIAGNOSIS — Z12.31 OTHER SCREENING MAMMOGRAM: ICD-10-CM

## 2025-03-27 DIAGNOSIS — M54.2 NECK PAIN: ICD-10-CM

## 2025-03-27 DIAGNOSIS — I49.3 VENTRICULAR PREMATURE BEATS: ICD-10-CM

## 2025-03-27 PROCEDURE — 99396 PREV VISIT EST AGE 40-64: CPT | Mod: S$GLB,,,

## 2025-03-27 PROCEDURE — 73030 X-RAY EXAM OF SHOULDER: CPT | Mod: TC,PO,LT

## 2025-03-27 PROCEDURE — 73030 X-RAY EXAM OF SHOULDER: CPT | Mod: 26,LT,, | Performed by: RADIOLOGY

## 2025-03-27 RX ORDER — FEZOLINETANT 45 MG/1
45 TABLET, FILM COATED ORAL DAILY
Qty: 30 TABLET | Refills: 2 | Status: SHIPPED | OUTPATIENT
Start: 2025-03-27

## 2025-03-27 NOTE — PATIENT INSTRUCTIONS
[facility]  PSYCHI[facility] -  [place]  [address]  [place] [address]  P: [phone] OPT 3    [facility] -  [place]  [address] [facility] [address]  [address]  [place] [address]  P:[phone] OPT 2     ? [facility] -  [place]  [address] [facility]  [address]  [place]  P: [phone] OPT 1    ? [facility]  PSYCHIATRY - [place]  [address] [place] [address]  P:[phone]     ? [facility] CHILD  DEVELOPMENT  [address] [place] [address]  P: [phone]    ? [facility]YCHIAT[facility] -  [place]  [address] [place] [address]  P:[phone]    COMMUNITY RESOURCES:  [place] / [place]  ? [facility]  [address] [facility]  [place], [address]  P:[phone] [phone]  Accepts all insurances  except: [place] Healthcare  Connections    ? [facility]  [address] [place] [address]  P:[phone] [phone]  IP: MEDICAID,  MEDICARE, COMMERCIAL INSURANCE    ? [facility]  [address] [place] [address]  P:[phone]  Psychoeducational,  autism, and ADHD evals  Accepts Medicare and  Comm ins    ? [facility] *  [address] [place] [address]  P:[phone]  Accepts some insurances  Sliding scale    ? [facility]  [address]  [address]  [place] [address]  P:[phone]    ? [facility]  [address] [facility]  [place] [address]  P:[phone]  Accepts Medicaid,  Medicare, Comm    ? [facility]  [address] [facility] [address]  [place] [address]  P:[phone]  IP: Medicare/  Comm/Cash pay    ? [facility]  [address] [address]  P: [phone]  Accepts Comm, Sliding  Scale    ? [facility]  [address] [address]  P:[phone]  Accepts Comm only    ? [facility]  [address] [place] [address]  P:[phone]  Accepts  Medicaid and  most Comm  Must do intake    ? [facility]  [address] [place] [address]  P:[phone]  Accepts all 5 Medicaid  and Comm    ? Tyler Mosquera Jr., MD  [address]  [place] [address]  P:[phone]  Takes [place] Medicaid    ? Aguila Lopez, PhD  [address] [facility]   [address]  [place] [address]  P:[phone]    ? Robert Lopez, PhD  [address] [address]  P:[phone]  Takes [place] Medicaid  2    [place]  ? [facility]  [address]  [place] [address]  P:[phone]  Accepts all 5 Medicaid    ? [facility] *  [address]  [place] [address]  P:[phone]  F:[phone]  Accepts all insurances  except: [place] Healthcare  Connections    ? [facility] *  [address] [facility]  [place] [address]  P:[phone]  F:[phone]  IOP: AmeriHealth  Medicaid, Medicare,  some Comm    ? [facility] *  [address] [address]  P:[phone]  Accepts Comm Only  Pt must call for intake  Adults: ADHD/ADD only  Children: Various  Disorders    ? [facility]  [address] [place] [address]  [phone]  Accepts Medicaid Only    ? [facility]  Behavioral *  [address] [facility] [place] [address]  P:[phone]  Accepts some insurances  Sliding scale    ? [facility]  [address] [place] [address]  P:[phone] [phone] (records)  Accepts Medicaid Only    ? [facility] *  [address]  [place] [address]  P:[phone]  Cash pay only    ? [facility]  [address]  [place] [address]  P:[phone]  Couples/marriage  counseling, family, kids,  adults  Accepts some insurance    ? [facility]  [address] [facility]  [place] [address]  P:[phone]  F:[phone]  Cash pay / sliding scale    ? [place] [address] [facility] [address] [place], [place]  P:  [phone]    ADULTS ONLY    ? [facility]  [address]  [place] [address]  P:[phone]    ? [facility]  [address]  [place] [address]  [phone]  Children, Adults, Family  & Group Counseling  [place] Medicaid    [facility], [facility]  [address]  [phone]    ROGE ROBIN, [facility]  [address] [facility]  [phone]    PAT ARRIOLA, [facility]  [address] [facility] [address]  [phone]    [facility]  [address] [facility]. [address]  [phone]    RONNIE CHAVES PHD, PSYCHOLOGIST  [address] [facility]   [phone]    ADE ARORA, [facility]  [address] [facility]   [phone]      [place]    ? [facility]  [place]  [address]  [place] [address]  [phone]  F; [phone]  Accepts most ins, and  [place] Medicaid. Adult,  child, and family services    ? [facility],  [facility]  [address] [place] [address]  P: [phone]  Accepts some  commercial insurance  and [place] Medicaid. Adults,  children, and families    ? [facility], [facility]  Nessa Pete, [facility]  [address] [place] [address]  P: [phone]  Accepts some ins and [place]  Medicaid    ? Bisi Lo PSyD  [address],  [place] [address]  [phone]  Adults only  ? [facility]  [address] [facility]  [address]  [place] [address]  P: [phone]  Adults and children  medication management    ? [facility] Counseling  [address] [place] [address], [place] [address]  P: [phone]  Adults and children  Therapy only  3  [place] (CONT.)  ? [facility]  Behavioral health  [address] [place] [address]  P: [phone]  Adults, Children,  medication management,  individual and family  counseling sessions  ? [facility]  Dr. Jan Lomax  [address]  [address]  P: [phone]  ? [facility]  [address] [place] [address]  P: [phone]  F: [phone]  Does not take ,  sees adults and children,  medication management,  therapy, and testing    ? [facility]  [address] [place] [address]  P: [phone] or  [phone]  Adults, children, med  management, therapy,  marital and family  counseling, IOP    ? [place] [address] [place] [address]  P: [phone] (allow  48 business hours for  reply) Adults, children,  med management,  therapy, EMDR, CBT  J[facility]HUA ADAMS /[facility]    ? [facility]  [address] [facility]  [place] [address]  P: [phone]  ? [facility]  [address] [facility]  [place] [address]  P: [phone]  ? [facility]  [facility] 3rd  floor  [address] [facility]  [place] [address]  P: [phone]  ? [facility]  [address] [facility]  [address]  [place] [address]  P: [phone]  ? [facility]  - [facility]  is on the 2nd floor  [address] [place] [address]  P: [phone]  VIRTUAL OPTIONS  ? [facility]  [email or web address]  therapy only,  adolescents, adults,  marriage /couples  counseling, 45 minute  virtual sessions at $85  each  ? [facility]  [facility]  [email or web address]  Free for 90 days to  residents of [place], [facility], or  [facility]  P: [phone]  Email:  [email or web address]      Therapists Outside of [facility]    Rodney Crenshaw LCSW   [phone]    Ashlyn Zavala LPC   [phone].       Letitia Pappas LCSW   [email or web address]   Office:  [address] [address]   [place] [address]   Phone / Fax   Phone: [phone]   Email    [email or web address]       Patsy Esposito, [address]   [place] [address]   [phone]    Deepti Gonzalez, [facility]   Specializes in eating disorders, depression, anxiety-females only   [address] [place] [address]    [phone]         Salvador Torres , PhD   [email or web address]   [phone]   [address] [place], [place] [address]      Anna Kc, PhD   [phone]   [address] [place] [address]      Milka Og, [facility]   [phone]   [address] [place] [address]      Dimple Arriola, [facility]   [email or web address]   [phone]   [address] [place] [address]     [facility]

## 2025-03-31 ENCOUNTER — TELEPHONE (OUTPATIENT)
Dept: FAMILY MEDICINE | Facility: CLINIC | Age: 55
End: 2025-03-31
Payer: OTHER GOVERNMENT

## 2025-03-31 ENCOUNTER — RESULTS FOLLOW-UP (OUTPATIENT)
Dept: FAMILY MEDICINE | Facility: CLINIC | Age: 55
End: 2025-03-31

## 2025-03-31 DIAGNOSIS — I10 HYPERTENSION, UNSPECIFIED TYPE: ICD-10-CM

## 2025-03-31 DIAGNOSIS — Z00.00 WELLNESS EXAMINATION: Primary | ICD-10-CM

## 2025-03-31 DIAGNOSIS — Z78.0 MENOPAUSE: ICD-10-CM

## 2025-03-31 DIAGNOSIS — Z79.899 ENCOUNTER FOR LONG-TERM (CURRENT) USE OF MEDICATIONS: ICD-10-CM

## 2025-03-31 NOTE — TELEPHONE ENCOUNTER
----- Message from Alyx sent at 3/31/2025  3:43 PM CDT -----  Pt is calling margarito back about her results [phone]

## 2025-03-31 NOTE — TELEPHONE ENCOUNTER
Spoke with pt already in regards to recent xray results. Please see other telephone encounter for today.

## 2025-04-01 ENCOUNTER — RESULTS FOLLOW-UP (OUTPATIENT)
Dept: FAMILY MEDICINE | Facility: CLINIC | Age: 55
End: 2025-04-01

## 2025-04-01 LAB
ALBUMIN SERPL-MCNC: 4.4 G/DL (ref 3.6–5.1)
ALBUMIN/GLOB SERPL: 1.4 (CALC) (ref 1–2.5)
ALP SERPL-CCNC: 79 U/L (ref 37–153)
ALT SERPL-CCNC: 23 U/L (ref 6–29)
APPEARANCE UR: CLEAR
AST SERPL-CCNC: 27 U/L (ref 10–35)
BACTERIA #/AREA URNS HPF: ABNORMAL /HPF
BACTERIA UR CULT: ABNORMAL
BASOPHILS # BLD AUTO: 28 CELLS/UL (ref 0–200)
BASOPHILS NFR BLD AUTO: 0.5 %
BILIRUB SERPL-MCNC: 0.4 MG/DL (ref 0.2–1.2)
BILIRUB UR QL STRIP: NEGATIVE
BUN SERPL-MCNC: 19 MG/DL (ref 7–25)
BUN/CREAT SERPL: NORMAL (CALC) (ref 6–22)
CALCIUM SERPL-MCNC: 9.5 MG/DL (ref 8.6–10.4)
CHLORIDE SERPL-SCNC: 102 MMOL/L (ref 98–110)
CHOLEST SERPL-MCNC: 202 MG/DL
CHOLEST/HDLC SERPL: 2.4 (CALC)
CO2 SERPL-SCNC: 25 MMOL/L (ref 20–32)
COLOR UR: ABNORMAL
CREAT SERPL-MCNC: 0.71 MG/DL (ref 0.5–1.03)
EGFR: 100 ML/MIN/1.73M2
EOSINOPHIL # BLD AUTO: 0 CELLS/UL (ref 15–500)
EOSINOPHIL NFR BLD AUTO: 0 %
ERYTHROCYTE [DISTWIDTH] IN BLOOD BY AUTOMATED COUNT: 13.4 % (ref 11–15)
GLOBULIN SER CALC-MCNC: 3.1 G/DL (CALC) (ref 1.9–3.7)
GLUCOSE SERPL-MCNC: 88 MG/DL (ref 65–99)
GLUCOSE UR QL STRIP: NEGATIVE
HCT VFR BLD AUTO: 38.3 % (ref 35–45)
HDLC SERPL-MCNC: 83 MG/DL
HGB BLD-MCNC: 13 G/DL (ref 11.7–15.5)
HGB UR QL STRIP: NEGATIVE
HYALINE CASTS #/AREA URNS LPF: ABNORMAL /LPF
KETONES UR QL STRIP: NEGATIVE
LDLC SERPL CALC-MCNC: 103 MG/DL (CALC)
LEUKOCYTE ESTERASE UR QL STRIP: NEGATIVE
LYMPHOCYTES # BLD AUTO: 1557 CELLS/UL (ref 850–3900)
LYMPHOCYTES NFR BLD AUTO: 28.3 %
MCH RBC QN AUTO: 30.5 PG (ref 27–33)
MCHC RBC AUTO-ENTMCNC: 33.9 G/DL (ref 32–36)
MCV RBC AUTO: 89.9 FL (ref 80–100)
MONOCYTES # BLD AUTO: 407 CELLS/UL (ref 200–950)
MONOCYTES NFR BLD AUTO: 7.4 %
NEUTROPHILS # BLD AUTO: 3509 CELLS/UL (ref 1500–7800)
NEUTROPHILS NFR BLD AUTO: 63.8 %
NITRITE UR QL STRIP: NEGATIVE
NONHDLC SERPL-MCNC: 119 MG/DL (CALC)
PH UR STRIP: 5.5 [PH] (ref 5–8)
PLATELET # BLD AUTO: 209 THOUSAND/UL (ref 140–400)
PMV BLD REES-ECKER: 9.2 FL (ref 7.5–12.5)
POTASSIUM SERPL-SCNC: 4 MMOL/L (ref 3.5–5.3)
PROT SERPL-MCNC: 7.5 G/DL (ref 6.1–8.1)
PROT UR QL STRIP: ABNORMAL
RBC # BLD AUTO: 4.26 MILLION/UL (ref 3.8–5.1)
RBC #/AREA URNS HPF: ABNORMAL /HPF
SERVICE CMNT-IMP: ABNORMAL
SODIUM SERPL-SCNC: 138 MMOL/L (ref 135–146)
SP GR UR STRIP: 1.02 (ref 1–1.03)
SQUAMOUS #/AREA URNS HPF: ABNORMAL /HPF
TRIGL SERPL-MCNC: 69 MG/DL
TSH SERPL-ACNC: 2.31 MIU/L
WBC # BLD AUTO: 5.5 THOUSAND/UL (ref 3.8–10.8)
WBC #/AREA URNS HPF: ABNORMAL /HPF

## 2025-04-02 ENCOUNTER — HOSPITAL ENCOUNTER (OUTPATIENT)
Dept: CARDIOLOGY | Facility: CLINIC | Age: 55
Discharge: HOME | End: 2025-04-02
Payer: OTHER GOVERNMENT

## 2025-04-02 DIAGNOSIS — I49.3 VENTRICULAR PREMATURE BEATS: ICD-10-CM

## 2025-04-06 DIAGNOSIS — F10.10 ALCOHOL ABUSE: ICD-10-CM

## 2025-04-06 DIAGNOSIS — K21.9 GASTROESOPHAGEAL REFLUX DISEASE, UNSPECIFIED WHETHER ESOPHAGITIS PRESENT: ICD-10-CM

## 2025-04-06 NOTE — PROGRESS NOTES
SUBJECTIVE:    Patient ID: Joanne Hummel is a 54 y.o. female.    Chief Complaint: Annual Exam (No bottles//Pt is here for an annual exam//KE)    HPI  History of Present Illness    CHIEF COMPLAINT:  Joanne presents today for follow up of palpitations    CARDIOVASCULAR:  She experiences palpitations, described as a hollow beating sensation like a heartbeat inside an empty metal barrel. She denies associated pain, shortness of breath, or lightheadedness. The frequency of palpitations has improved since increasing metoprolol dose.    MUSCULOSKELETAL:  She reports shoulder pain that radiates down her arm and wakes her at night. She can perform activities like carrying groceries and milk jugs without weakness, and denies numbness. She has left ankle swelling without pain, morning finger stiffness that improves with movement, and intermittent spontaneous elbow pain without clear triggers.    MENOPAUSAL SYMPTOMS:  She experiences hot flashes with significant temperature fluctuations, rapidly alternating between feeling cold and sweating profusely. She reports significant fatigue, feeling the need to nap within 20 minutes of waking.    MEDICAL HISTORY:  She has a history of IBS. Colonoscopy 5 years ago showed a polyp.    MENTAL HEALTH:  She is seeking mental health support and counseling services to address ongoing issues with her father and requests therapist referrals.      ROS:  General: -fever, -chills, +fatigue, -weight gain, -weight loss  Eyes: -vision changes, -redness, -discharge  ENT: -ear pain, -nasal congestion, -sore throat  Cardiovascular: -chest pain, +palpitations, -lower extremity edema  Respiratory: -cough, -shortness of breath  Gastrointestinal: -abdominal pain, -nausea, -vomiting, -diarrhea, -constipation, -blood in stool, +change in bowel habits  Genitourinary: -dysuria, -hematuria, -frequency  Musculoskeletal: +joint pain, -muscle pain, +limb swelling, +joint swelling, +joint stiffness, +nightime pain,  +limb pain  Skin: -rash, -lesion  Neurological: -headache, -dizziness, -numbness, -tingling  Psychiatric: -anxiety, -depression, -sleep difficulty  Endocrine: +hot flashes         No visits with results within 6 Month(s) from this visit.   Latest known visit with results is:   Lab Visit on 08/22/2024   Component Date Value Ref Range Status    LH 08/22/2024 26.3  mIU/mL Final    Follicle Stimulating Hormone 08/22/2024 58.8  mIU/mL Final    Estradiol 08/22/2024 <5.0  pg/mL Final       Past Medical History:   Diagnosis Date    Allergy     Anemia     Anxiety     Arthritis     Depression     Personal history of colonic polyps 01/13/2020    Colonoscopy Gila Dias MD     Social History[1]  Past Surgical History:   Procedure Laterality Date    AUGMENTATION OF BREAST Bilateral     colonscopy      EYE SURGERY      TONSILLECTOMY       Family History   Problem Relation Name Age of Onset    Arthritis Mother      Skin cancer Mother      Alcohol abuse Father      COPD Maternal Grandfather      Alcohol abuse Paternal Grandfather         The 10-year CVD risk score (DENISE'Agostino et al., 2008) is: 5.6%    Values used to calculate the score:      Age: 54 years      Sex: Female      Diabetic: No      Tobacco smoker: No      Systolic Blood Pressure: 128 mmHg      Is BP treated: Yes      HDL Cholesterol: 83 mg/dL      Total Cholesterol: 202 mg/dL    Tests to Keep You Healthy    Mammogram: Met on 6/26/2024  Colon Cancer Screening: DUE  Cervical Cancer Screening: Met on 5/15/2023  Last Blood Pressure <= 139/89 (3/27/2025): Yes      Review of patient's allergies indicates:   Allergen Reactions    Epinephrine Palpitations     Current Medications[2]    Review of Systems   Constitutional:  Negative for activity change and unexpected weight change.   HENT:  Negative for hearing loss, rhinorrhea and trouble swallowing.    Eyes:  Negative for discharge and visual disturbance.   Respiratory:  Negative for chest tightness and wheezing.   "  Cardiovascular:  Negative for chest pain and palpitations.   Gastrointestinal:  Positive for diarrhea. Negative for blood in stool, constipation and vomiting.   Endocrine: Negative for polydipsia and polyuria.   Genitourinary:  Negative for difficulty urinating, dysuria, hematuria and menstrual problem.   Musculoskeletal:  Positive for arthralgias, joint swelling and neck pain.   Neurological:  Negative for weakness and headaches.   Psychiatric/Behavioral:  Positive for dysphoric mood. Negative for confusion.            Objective:      Vitals:    03/27/25 1534   BP: 128/78   Pulse: 68   SpO2: (!) 91%   Weight: 81.6 kg (179 lb 12.8 oz)   Height: 5' 6" (1.676 m)     Physical Exam  Vitals and nursing note reviewed.   Constitutional:       General: She is not in acute distress.     Appearance: Normal appearance. She is well-developed and normal weight. She is not ill-appearing.   HENT:      Head: Normocephalic and atraumatic.      Nose: Nose normal.      Mouth/Throat:      Mouth: Mucous membranes are moist.      Pharynx: Oropharynx is clear.   Eyes:      General: No scleral icterus.     Pupils: Pupils are equal, round, and reactive to light.   Neck:      Thyroid: No thyromegaly.      Vascular: No carotid bruit.   Cardiovascular:      Rate and Rhythm: Normal rate and regular rhythm.      Pulses: Normal pulses.      Heart sounds: Normal heart sounds. No murmur heard.  Pulmonary:      Effort: Pulmonary effort is normal.      Breath sounds: Normal breath sounds. No wheezing or rales.   Abdominal:      Palpations: Abdomen is soft.      Tenderness: There is no abdominal tenderness.   Musculoskeletal:         General: Normal range of motion.      Lumbar back: Normal. No spasms.      Right lower leg: No edema.      Left lower leg: No edema.   Skin:     General: Skin is warm and dry.      Capillary Refill: Capillary refill takes less than 2 seconds.      Coloration: Skin is not jaundiced or pale.   Neurological:      General: " No focal deficit present.      Mental Status: She is alert and oriented to person, place, and time. Mental status is at baseline.      Cranial Nerves: No cranial nerve deficit.      Sensory: No sensory deficit.      Motor: No weakness.      Gait: Gait normal.   Psychiatric:         Mood and Affect: Mood normal.         Behavior: Behavior normal. Behavior is cooperative.         Thought Content: Thought content normal.         Judgment: Judgment normal.       Physical Exam              Assessment:       1. Wellness examination    2. Encounter for screening for malignant neoplasm of colon    3. Encounter for lipid screening for cardiovascular disease    4. Ventricular premature beats    5. Vasomotor symptoms due to menopause    6. Upper back pain    7. Neck pain    8. Acute pain of left shoulder    9. Other screening mammogram         Plan:       Wellness examination  -     CBC Auto Differential; Future; Expected date: 03/27/2025  -     Comprehensive Metabolic Panel; Future; Expected date: 03/27/2025  -     Lipid Panel; Future; Expected date: 03/27/2025  -     TSH w/reflex to FT4; Future; Expected date: 03/27/2025  -     Urinalysis, Reflex to Urine Culture; Future; Expected date: 03/27/2025    Encounter for screening for malignant neoplasm of colon  -     Ambulatory referral/consult to Gastroenterology; Future; Expected date: 04/03/2025    Encounter for lipid screening for cardiovascular disease  -     Lipid Panel; Future; Expected date: 03/27/2025    Ventricular premature beats  -     Cardiac Monitor - 3-15 Day Adult (Cupid Only); Future    Vasomotor symptoms due to menopause  -     fezolinetant (VEOZAH) 45 mg Tab; Take 45 mg by mouth Daily.  Dispense: 30 tablet; Refill: 2    Upper back pain  -     TENS unit and electrodes Cmpk; 1 Units by Misc.(Non-Drug; Combo Route) route as needed.    Neck pain  -     TENS unit and electrodes Cmpk; 1 Units by Misc.(Non-Drug; Combo Route) route as needed.    Acute pain of left  shoulder  -     X-Ray Shoulder 2 or More Views Left; Future; Expected date: 03/27/2025    Other screening mammogram  -     Mammo Digital Screening Bilat w/ Vern (XPD); Future; Expected date: 06/27/2025      Assessment & Plan        IMPRESSION:  - Assessed reported palpitations, likely premature ventricular contractions (PVCs). Recent increase in metoprolol has reduced frequency.  - Considered long-term cardiac monitoring to further evaluate palpitations.  - Evaluated left shoulder pain, possibly due to labral injury or rotator cuff issue rather than arthritis, given lack of weakness.  - Reviewed menopausal symptoms, including hot flashes and fatigue.    VENTRICULAR PREMATURE CONTRACTIONS (PVCS):  - Explained the nature of PVCs, noting they are common and generally not worrisome if not associated with other symptoms.  - Assessed the patient's symptoms as likely premature ventricular contractions (PVCs), consistent with previous experiences.  - Noted the patient's report of experiencing palpitations described as a hollow feeling, like a heartbeat in an empty metal barrel, without associated pain, dyspnea, or lightheadedness.  - Observed that the frequency of palpitations has decreased since increasing the metoprolol dose.  - Performed physical exam revealing regular rate and rhythm.  - Ordered a Zio patch cardiac monitor for long-term heart rhythm monitoring to catch any arrhythmias or confirm PVCs.  - Instructed the patient to press the event button on the monitor when experiencing symptoms.  - Advised the patient to await contact from the cardiology department to set up monitoring.    LEFT SHOULDER PAIN:  - Noted the patient's report of left shoulder pain waking her up at night, radiating down but not causing numbness in the hand.  - Performed physical exam revealing pain with resistance testing, particularly when the arm is extended forward or to the side, with no weakness noted.  - Assessed the condition as  possibly a labral injury or rotator cuff issue, less likely to be arthritis due to the nature of the pain.  - Ordered XR Left Shoulder with follow-up at imaging center at patient's convenience to rule out arthritis and potentially identify other issues.  - Suggested potential treatments including TENS unit, physical therapy, or corticosteroid injection if needed.  - Noted the patient is currently using diclofenac gel for pain management.    LOCALIZED EDEMA:  - Noted the patient's report of swelling in the left ankle, which does not cause pain.    ELBOW PAIN:  - Noted the patient's report of occasional pain in the elbows that comes and goes without apparent reason.  - Observed that the patient is taking meloxicam, which is reported to help with the pain.    MENOPAUSAL SYMPTOMS:  - Discussed potential duration of menopausal symptoms, noting they can continue for years.  - Noted the patient's report of experiencing hot flashes, rapid temperature changes, and fatigue associated with menopause.  - Prescribed Veozah, a non-hormonal treatment for menopausal symptoms, to be filled at a specialty pharmacy in [place].    FATIGUE:  - Noted the patient's report of significant fatigue, feeling the need to nap shortly after waking up.  - Acknowledged fatigue as a symptom of menopau  se but expressed uncertainty about its duration.    IRRITABLE BOWEL SYNDROME (IBS):  - Noted the patient's mention of having IBS.  - Referred the patient to gastroenterologist Dr. Carlson for IBS management.      HISTORY OF COLON POLYPS:  - Noted the patient's report of having a colonoscopy 5 years ago where a small polyp was found.  - Recommend follow-up colonoscopy in 5-7 years based on previous findings.  - Agreed to schedule a colonoscopy before the patient relocates.  - Referred the patient to Dr. Carlson in gastroenterology for colonoscopy.    COUNSELING:  - Noted the patient's inquiry about seeing a therapist to discuss family issues.  - Provided  a list of recommended counselors, including [facility].  - Discussed insurance coverage for mental health support.    GENERAL HEALTH MAINTENANCE:  - Joanne to continue current exercise routine with awareness of shoulder limitations.  - Joanne to maintain adequate hydration.  - Ordered CBC, CMP, cholesterol, thyroid, and UA with follow up for fasting lab work at patient's convenience.  - Contact office if no communication received within a few days.  - Follow up to schedule mammogram after June 26th.  - Ordered TENS unit as durable medical equipment for pain management.         Follow up in about 3 months (around 6/27/2025), or if symptoms worsen or fail to improve, for prn based on results of labs/heart monitor, or 3 months.        This note was generated with the assistance of ambient listening technology. Verbal consent was obtained by the patient and accompanying visitor(s) for the recording of patient appointment to facilitate this note. I attest to having reviewed and edited the generated note for accuracy, though some syntax or spelling errors may persist. Please contact the author of this note for any clarification.      4/6/2025 Alyx English         [1]   Social History  Socioeconomic History    Marital status:    Tobacco Use    Smoking status: Former     Current packs/day: 1.50     Average packs/day: 1.5 packs/day for 9.3 years (13.9 ttl pk-yrs)     Types: Cigarettes     Start date: 2016     Quit date: 1983    Smokeless tobacco: Never   Substance and Sexual Activity    Alcohol use: Yes     Comment: daily 2-3 drinks    Drug use: Never    Sexual activity: Yes     Partners: Male     Social Drivers of Health     Financial Resource Strain: Low Risk  (2/14/2024)    Overall Financial Resource Strain (CARDIA)     Difficulty of Paying Living Expenses: Not hard at all   Food Insecurity: No Food Insecurity (2/14/2024)    Hunger Vital Sign     Worried About Running Out of Food in the Last Year: Never  true     Ran Out of Food in the Last Year: Never true   Transportation Needs: No Transportation Needs (2/14/2024)    PRAPARE - Transportation     Lack of Transportation (Medical): No     Lack of Transportation (Non-Medical): No   Physical Activity: Inactive (2/14/2024)    Exercise Vital Sign     Days of Exercise per Week: 0 days     Minutes of Exercise per Session: 0 min   Stress: Stress Concern Present (2/14/2024)    Citizen of [place] [employer] - Occupational Stress Questionnaire     Feeling of Stress : Very much   Housing Stability: Low Risk  (2/14/2024)    Housing Stability Vital Sign     Unable to Pay for Housing in the Last Year: No     Number of Places Lived in the Last Year: 2     Unstable Housing in the Last Year: No   [2]   Current Outpatient Medications:     ALPRAZolam (XANAX) 0.25 MG tablet, Take 1 tablet (0.25 mg total) by mouth 2 (two) times daily as needed for Anxiety., Disp: 20 tablet, Rfl: 1    calcipotriene (DOVONOX) 0.005 % ointment, , Disp: , Rfl:     calcium-vitamin D 600 mg-10 mcg (400 unit) Tab, Take by mouth., Disp: , Rfl:     clobetasol 0.05% (TEMOVATE) 0.05 % Oint, Apply topically 2 (two) times daily., Disp: 30 g, Rfl: 2    COLLAGEN-BIOTIN-ASCORBIC ACID ORAL, Take 1 capsule by mouth once daily., Disp: , Rfl:     DHA-EPA-LIPOTROPIC AGENT-VIT E ORAL, , Disp: , Rfl:     diclofenac sodium (VOLTAREN ARTHRITIS PAIN) 1 % Gel, Apply 2 g topically 4 (four) times daily as needed (pain)., Disp: 150 g, Rfl: 5    ferrous sulfate (IRON) 325 mg (65 mg iron) Tab tablet, once daily., Disp: , Rfl:     folic acid (FOLVITE) 1 MG tablet, Take 1 tablet (1 mg total) by mouth once daily., Disp: 90 tablet, Rfl: 1    hydrOXYzine HCL (ATARAX) 10 MG Tab, Take 1 tablet (10 mg total) by mouth 3 (three) times daily as needed., Disp: 90 tablet, Rfl: 11    meloxicam (MOBIC) 15 MG tablet, Take 1 tablet (15 mg total) by mouth once daily., Disp: 30 tablet, Rfl: 5    metoprolol succinate (TOPROL-XL) 50 MG 24 hr  tablet, Take 1 tablet (50 mg total) by mouth once daily., Disp: 90 tablet, Rfl: 1    omeprazole (PRILOSEC) 20 MG capsule, Take 1 capsule (20 mg total) by mouth once daily., Disp: 90 capsule, Rfl: 1    sertraline (ZOLOFT) 100 MG tablet, Take 2 tablets (200 mg total) by mouth once daily., Disp: 180 tablet, Rfl: 1    tretinoin (RETIN-A) 0.1 % cream, Apply topically every evening., Disp: 20 g, Rfl: 0    fezolinetant (VEOZAH) 45 mg Tab, Take 45 mg by mouth Daily., Disp: 30 tablet, Rfl: 2    TENS unit and electrodes Cmpk, 1 Units by Misc.(Non-Drug; Combo Route) route as needed., Disp: , Rfl:

## 2025-04-07 RX ORDER — OMEPRAZOLE 20 MG/1
20 CAPSULE, DELAYED RELEASE ORAL DAILY
Qty: 90 CAPSULE | Refills: 1 | Status: SHIPPED | OUTPATIENT
Start: 2025-04-07

## 2025-04-07 RX ORDER — TRETINOIN 1 MG/G
CREAM TOPICAL NIGHTLY
Qty: 20 G | Refills: 0 | Status: SHIPPED | OUTPATIENT
Start: 2025-04-07

## 2025-04-07 RX ORDER — FOLIC ACID 1 MG/1
1 TABLET ORAL DAILY
Qty: 90 TABLET | Refills: 1 | Status: SHIPPED | OUTPATIENT
Start: 2025-04-07

## 2025-04-17 ENCOUNTER — TELEPHONE (OUTPATIENT)
Dept: FAMILY MEDICINE | Facility: CLINIC | Age: 55
End: 2025-04-17
Payer: OTHER GOVERNMENT

## 2025-04-17 NOTE — TELEPHONE ENCOUNTER
----- Message from Syeda sent at 4/17/2025 10:07 AM CDT -----  PA #[id number] for Veozah has been approved for 3/15/25 - 10/14/25. Thank you

## 2025-04-28 DIAGNOSIS — F41.9 ANXIETY: ICD-10-CM

## 2025-04-28 DIAGNOSIS — F32.9 MAJOR DEPRESSIVE DISORDER WITH CURRENT ACTIVE EPISODE, UNSPECIFIED DEPRESSION EPISODE SEVERITY, UNSPECIFIED WHETHER RECURRENT: Primary | ICD-10-CM

## 2025-04-28 RX ORDER — SERTRALINE HYDROCHLORIDE 100 MG/1
200 TABLET, FILM COATED ORAL DAILY
Qty: 180 TABLET | Refills: 3 | Status: SHIPPED | OUTPATIENT
Start: 2025-04-28 | End: 2026-04-28

## 2025-04-28 NOTE — TELEPHONE ENCOUNTER
Pt is needing a refill on her Sertraline. Last office visit 03/27/2025. Next office visit 06/26/2025.

## 2025-05-30 ENCOUNTER — TELEPHONE (OUTPATIENT)
Dept: RHEUMATOLOGY | Facility: CLINIC | Age: 55
End: 2025-05-30
Payer: OTHER GOVERNMENT

## 2025-05-30 DIAGNOSIS — M25.50 ARTHRALGIA, UNSPECIFIED JOINT: ICD-10-CM

## 2025-05-30 RX ORDER — MELOXICAM 15 MG/1
15 TABLET ORAL DAILY
Qty: 30 TABLET | Refills: 2 | Status: SHIPPED | OUTPATIENT
Start: 2025-05-30

## 2025-06-16 ENCOUNTER — TELEPHONE (OUTPATIENT)
Dept: FAMILY MEDICINE | Facility: CLINIC | Age: 55
End: 2025-06-16
Payer: OTHER GOVERNMENT

## 2025-06-16 NOTE — TELEPHONE ENCOUNTER
----- Message from Arnoldo Strange sent at 3/27/2025  3:38 PM CDT -----  Regarding: Mammogram due  Mammogram due 6/26/25 needs order

## 2025-06-22 DIAGNOSIS — K21.9 GASTROESOPHAGEAL REFLUX DISEASE, UNSPECIFIED WHETHER ESOPHAGITIS PRESENT: ICD-10-CM

## 2025-06-22 DIAGNOSIS — F10.10 ALCOHOL ABUSE: ICD-10-CM

## 2025-06-22 DIAGNOSIS — M25.50 ARTHRALGIA, UNSPECIFIED JOINT: ICD-10-CM

## 2025-06-22 RX ORDER — OMEPRAZOLE 20 MG/1
20 CAPSULE, DELAYED RELEASE ORAL DAILY
Qty: 90 CAPSULE | Refills: 1 | Status: CANCELLED | OUTPATIENT
Start: 2025-06-22

## 2025-06-22 RX ORDER — FOLIC ACID 1 MG/1
1 TABLET ORAL DAILY
Qty: 90 TABLET | Refills: 1 | Status: CANCELLED | OUTPATIENT
Start: 2025-06-22

## 2025-06-23 RX ORDER — MELOXICAM 15 MG/1
15 TABLET ORAL DAILY
Qty: 30 TABLET | Refills: 2 | Status: SHIPPED | OUTPATIENT
Start: 2025-06-23

## 2025-06-23 NOTE — TELEPHONE ENCOUNTER
Pt is needing a refill on her omeprazole, folic acid and meloxicam. Last office visit 03/27/2025. Next office visit  06/26/2025.

## 2025-06-26 ENCOUNTER — OFFICE VISIT (OUTPATIENT)
Dept: FAMILY MEDICINE | Facility: CLINIC | Age: 55
End: 2025-06-26
Payer: OTHER GOVERNMENT

## 2025-06-26 VITALS
SYSTOLIC BLOOD PRESSURE: 100 MMHG | HEIGHT: 66 IN | OXYGEN SATURATION: 95 % | DIASTOLIC BLOOD PRESSURE: 60 MMHG | HEART RATE: 75 BPM | BODY MASS INDEX: 28.99 KG/M2 | WEIGHT: 180.38 LBS

## 2025-06-26 DIAGNOSIS — Z51.81 ENCOUNTER FOR THERAPEUTIC DRUG MONITORING: ICD-10-CM

## 2025-06-26 DIAGNOSIS — K21.9 GASTROESOPHAGEAL REFLUX DISEASE, UNSPECIFIED WHETHER ESOPHAGITIS PRESENT: ICD-10-CM

## 2025-06-26 DIAGNOSIS — Z79.899 ENCOUNTER FOR LONG-TERM (CURRENT) USE OF HIGH-RISK MEDICATION: ICD-10-CM

## 2025-06-26 DIAGNOSIS — F41.9 ANXIETY: ICD-10-CM

## 2025-06-26 DIAGNOSIS — Z12.11 SPECIAL SCREENING FOR MALIGNANT NEOPLASMS, COLON: ICD-10-CM

## 2025-06-26 DIAGNOSIS — I49.3 VENTRICULAR PREMATURE BEATS: Primary | ICD-10-CM

## 2025-06-26 DIAGNOSIS — H57.89 EYE IRRITATION: ICD-10-CM

## 2025-06-26 PROBLEM — L20.89 OTHER ATOPIC DERMATITIS: Status: ACTIVE | Noted: 2025-06-26

## 2025-06-26 PROCEDURE — 99214 OFFICE O/P EST MOD 30 MIN: CPT | Mod: S$GLB,,,

## 2025-06-26 RX ORDER — METOPROLOL SUCCINATE 50 MG/1
50 TABLET, EXTENDED RELEASE ORAL DAILY
Qty: 90 TABLET | Refills: 3 | Status: SHIPPED | OUTPATIENT
Start: 2025-06-26

## 2025-06-26 RX ORDER — OMEPRAZOLE 20 MG/1
20 CAPSULE, DELAYED RELEASE ORAL DAILY
Qty: 90 CAPSULE | Refills: 3 | Status: SHIPPED | OUTPATIENT
Start: 2025-06-26

## 2025-06-26 RX ORDER — NEOMYCIN SULFATE, POLYMYXIN B SULFATE AND DEXAMETHASONE 3.5; 10000; 1 MG/ML; [USP'U]/ML; MG/ML
1 SUSPENSION/ DROPS OPHTHALMIC 3 TIMES DAILY
Qty: 5 ML | Refills: 0 | Status: SHIPPED | OUTPATIENT
Start: 2025-06-26 | End: 2025-07-01

## 2025-06-26 NOTE — PATIENT INSTRUCTIONS
Rl Rubio,     If you are due for any health screening(s) below please notify me so we can arrange them to be ordered and scheduled. Most healthy patients at your age complete them, but you are free to accept or refuse.     If you can't do it, I'll definitely understand. If you can, I'd certainly appreciate it!    Tests to Keep You Healthy    Mammogram: Met on 6/26/2024  Colon Cancer Screening: DUE  Cervical Cancer Screening: Met on 5/15/2023  Last Blood Pressure <= 139/89 (6/26/2025): Yes      Its time for your colon cancer screening     Colorectal cancer is one of the leading causes of cancer death for men and women but it doesnt have to be. Screenings can prevent colorectal cancer or find it early enough to treat and cure the disease.     Our records indicate that you may be overdue for colon cancer screening. A colonoscopy or stool screening test can help identify patients at risk for developing colon cancer. Cancer screenings save lives, so schedule yours today to stay healthy.     A colonoscopy is the preferred test for detecting colon cancer. It is needed only once every 10 years if results are negative. While you are sedated, a flexible, lighted tube with a tiny camera is inserted into the rectum and advanced through the colon to look for cancers.     An alternative screening test that is used at home and returned to the lab may also be used. It detects hidden blood in bowel movements which could indicate cancer in the colon. If results are positive, you will need a colonoscopy to determine if the blood is a sign of cancer. This type of follow up (diagnostic) colonoscopy usually requires additional copays as required by your insurance provider.     If you recently had your colon cancer screening performed outside of [facility], please let your Health care team know so that they can update your health record. Please contact your PCP if you have any questions.

## 2025-06-27 ENCOUNTER — HOSPITAL ENCOUNTER (OUTPATIENT)
Dept: RADIOLOGY | Facility: HOSPITAL | Age: 55
Discharge: HOME | End: 2025-06-27
Payer: OTHER GOVERNMENT

## 2025-06-27 DIAGNOSIS — Z12.31 OTHER SCREENING MAMMOGRAM: ICD-10-CM

## 2025-06-27 PROCEDURE — 77067 SCR MAMMO BI INCL CAD: CPT | Mod: 26,,, | Performed by: RADIOLOGY

## 2025-06-27 PROCEDURE — 77063 BREAST TOMOSYNTHESIS BI: CPT | Mod: TC,PO

## 2025-06-27 PROCEDURE — 77063 BREAST TOMOSYNTHESIS BI: CPT | Mod: 26,,, | Performed by: RADIOLOGY

## 2025-06-29 ENCOUNTER — PATIENT MESSAGE (OUTPATIENT)
Dept: FAMILY MEDICINE | Facility: CLINIC | Age: 55
End: 2025-06-29
Payer: OTHER GOVERNMENT

## 2025-06-29 DIAGNOSIS — T85.43XA BREAST IMPLANT LEAK, INITIAL ENCOUNTER: Primary | ICD-10-CM
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
## 2025-07-17 ENCOUNTER — HOSPITAL ENCOUNTER (OUTPATIENT)
Dept: RADIOLOGY | Facility: HOSPITAL | Age: 55
Discharge: HOME | End: 2025-07-17
Payer: OTHER GOVERNMENT

## 2025-07-17 DIAGNOSIS — T85.43XA BREAST IMPLANT LEAK, INITIAL ENCOUNTER: ICD-10-CM

## 2025-07-17 PROCEDURE — 76642 ULTRASOUND BREAST LIMITED: CPT | Mod: TC,PO,RT

## 2025-07-17 PROCEDURE — 76642 ULTRASOUND BREAST LIMITED: CPT | Mod: 26,RT,, | Performed by: RADIOLOGY

## 2025-07-21 ENCOUNTER — TELEPHONE (OUTPATIENT)
Dept: FAMILY MEDICINE | Facility: CLINIC | Age: 55
End: 2025-07-21
Payer: OTHER GOVERNMENT

## 2025-07-21 NOTE — LETTER
[address] [facility] [address]  [place] [address]  Phone: [phone]   [phone]                              MD Blair Mckay, MD Dread Elder, MD Be Davalos, PAIDRIS English, DAPHNEY Patel, DAPHNEY Hernandes, DAPHNEY Solomon, DAPHNEY      Date: 7/21/2025      Dear Ms Hummel    Below is your ultrasound result from Alyx Alejandre, JETHRO-CNP     7/17/25  5:28 PM  Note  Good news is ultrasound shows no evidence of any malignant concern. Alternatively, does confirm that her implant has collapsed.        We tried to reach you several times by telephone with no success. Please let us know if you have any questions.    Thank you,  Alyx English NP/dena

## 2025-07-21 NOTE — TELEPHONE ENCOUNTER
----- Message from Nurse West sent at 7/21/2025  3:28 PM CDT -----    ----- Message -----  From: Alyx English APRN-CNP  Sent: 7/21/2025  12:26 PM CDT  To: Tameka Wisdom Staff    Yes please.  ----- Message -----  From: Jenna Crenshaw LPN  Sent: 7/21/2025  11:08 AM CDT  To: LYNDA Dominguez      ----- Message -----  From: Alyx English APRN-CNP  Sent: 7/17/2025   5:28 PM CDT  To: Tameka Wisdom Staff    Good news is ultrasound shows no evidence of any malignant concern. Alternatively, does confirm that her implant has collapsed.   ----- Message -----  From: Ambrosio Rad Results In  Sent: 7/17/2025   4:01 PM CDT  To: LYNDA Dominguez

## 2025-07-23 ENCOUNTER — PATIENT MESSAGE (OUTPATIENT)
Dept: FAMILY MEDICINE | Facility: CLINIC | Age: 55
End: 2025-07-23
Payer: OTHER GOVERNMENT

## 2025-08-07 ENCOUNTER — PATIENT MESSAGE (OUTPATIENT)
Dept: FAMILY MEDICINE | Facility: CLINIC | Age: 55
End: 2025-08-07
Payer: OTHER GOVERNMENT

## 2025-08-07 DIAGNOSIS — F41.9 ANXIETY: ICD-10-CM

## 2025-08-07 DIAGNOSIS — K21.9 GASTROESOPHAGEAL REFLUX DISEASE, UNSPECIFIED WHETHER ESOPHAGITIS PRESENT: ICD-10-CM

## 2025-08-07 DIAGNOSIS — L30.9 ECZEMA, UNSPECIFIED TYPE: ICD-10-CM

## 2025-08-07 DIAGNOSIS — F10.10 ALCOHOL ABUSE: ICD-10-CM

## 2025-08-07 DIAGNOSIS — M25.50 ARTHRALGIA, UNSPECIFIED JOINT: ICD-10-CM

## 2025-08-08 RX ORDER — MELOXICAM 15 MG/1
15 TABLET ORAL DAILY
Qty: 30 TABLET | Refills: 2 | Status: SHIPPED | OUTPATIENT
Start: 2025-08-08 | End: 2025-08-08

## 2025-08-08 RX ORDER — CLOBETASOL PROPIONATE 0.5 MG/G
OINTMENT TOPICAL 2 TIMES DAILY
Qty: 30 G | Refills: 2 | Status: SHIPPED | OUTPATIENT
Start: 2025-08-08 | End: 2025-08-08

## 2025-08-08 RX ORDER — OMEPRAZOLE 20 MG/1
20 CAPSULE, DELAYED RELEASE ORAL DAILY
Qty: 90 CAPSULE | Refills: 3 | Status: SHIPPED | OUTPATIENT
Start: 2025-08-08

## 2025-08-08 RX ORDER — MELOXICAM 15 MG/1
15 TABLET ORAL DAILY
Qty: 30 TABLET | Refills: 2 | Status: SHIPPED | OUTPATIENT
Start: 2025-08-08

## 2025-08-08 RX ORDER — OMEPRAZOLE 20 MG/1
20 CAPSULE, DELAYED RELEASE ORAL DAILY
Qty: 90 CAPSULE | Refills: 3 | Status: SHIPPED | OUTPATIENT
Start: 2025-08-08 | End: 2025-08-08

## 2025-08-08 RX ORDER — FOLIC ACID 1 MG/1
1 TABLET ORAL DAILY
Qty: 90 TABLET | Refills: 1 | Status: SHIPPED | OUTPATIENT
Start: 2025-08-08

## 2025-08-08 RX ORDER — HYDROXYZINE HYDROCHLORIDE 10 MG/1
10 TABLET, FILM COATED ORAL 3 TIMES DAILY PRN
Qty: 90 TABLET | Refills: 11 | Status: SHIPPED | OUTPATIENT
Start: 2025-08-08

## 2025-08-08 RX ORDER — TRETINOIN 1 MG/G
CREAM TOPICAL NIGHTLY
Qty: 20 G | Refills: 0 | Status: SHIPPED | OUTPATIENT
Start: 2025-08-08 | End: 2025-08-08

## 2025-08-08 RX ORDER — CLOBETASOL PROPIONATE 0.5 MG/G
OINTMENT TOPICAL 2 TIMES DAILY
Qty: 30 G | Refills: 2 | Status: SHIPPED | OUTPATIENT
Start: 2025-08-08

## 2025-08-08 RX ORDER — TRETINOIN 1 MG/G
CREAM TOPICAL NIGHTLY
Qty: 20 G | Refills: 0 | Status: SHIPPED | OUTPATIENT
Start: 2025-08-08

## 2025-08-08 RX ORDER — HYDROXYZINE HYDROCHLORIDE 10 MG/1
10 TABLET, FILM COATED ORAL 3 TIMES DAILY PRN
Qty: 90 TABLET | Refills: 11 | Status: SHIPPED | OUTPATIENT
Start: 2025-08-08 | End: 2025-08-08

## 2025-08-08 RX ORDER — FOLIC ACID 1 MG/1
1 TABLET ORAL DAILY
Qty: 90 TABLET | Refills: 1 | Status: SHIPPED | OUTPATIENT
Start: 2025-08-08 | End: 2025-08-08